# Patient Record
Sex: MALE | Race: WHITE | NOT HISPANIC OR LATINO | Employment: FULL TIME | URBAN - METROPOLITAN AREA
[De-identification: names, ages, dates, MRNs, and addresses within clinical notes are randomized per-mention and may not be internally consistent; named-entity substitution may affect disease eponyms.]

---

## 2017-04-17 ENCOUNTER — ALLSCRIPTS OFFICE VISIT (OUTPATIENT)
Dept: OTHER | Facility: OTHER | Age: 47
End: 2017-04-17

## 2017-12-05 ENCOUNTER — ALLSCRIPTS OFFICE VISIT (OUTPATIENT)
Dept: OTHER | Facility: OTHER | Age: 47
End: 2017-12-05

## 2017-12-05 DIAGNOSIS — Z00.00 ENCOUNTER FOR GENERAL ADULT MEDICAL EXAMINATION WITHOUT ABNORMAL FINDINGS: ICD-10-CM

## 2017-12-05 DIAGNOSIS — Z12.5 ENCOUNTER FOR SCREENING FOR MALIGNANT NEOPLASM OF PROSTATE: ICD-10-CM

## 2017-12-06 NOTE — PROGRESS NOTES
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)    Plan  Health Maintenance    · (1) CBC/PLT/DIFF; Status:Active; Requested for:79Vzy2763;    · (1) COMPREHENSIVE METABOLIC PANEL; Status:Active; Requested for:20Mqr0852;    · (1) LIPID PANEL FASTING W DIRECT LDL REFLEX; Status:Active; Requestedfor:85Bpj3242;    · (1) TSH WITH FT4 REFLEX; Status:Active; Requested for:62Vtu8963; Health Maintenance, Encounter for prostate cancer screening    · (1) PSA (SCREEN) (Dx V76 44 Screen for Prostate Cancer); Status:Active; Requestedfor:51Kff8781;   PMH: Muscle ache    · Butalbital-APAP-Caffeine -40 MG Oral Tablet (Fioricet); TAKE 1 TABLETEVERY 6 HOURS AS NEEDED  PMH: Pain of male genitalia    · Urine Dip Non-Automated- POC; Status:Canceled;   Screening for depression    · *VB-Depression Screening; Status:Temporary Deferral - Pt refuses;    12/5/2018    Discussion/Summary    --probable R lateral femoral cutaneous neuropathy: Patient complains of burning tingling in his right hip for the past few weeks  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  rec: Attempt to avoid tool belt for now  If symptoms persist or worsen, consider checking EMG right lower extremity  given for routine fasting blood work    Patient instructed to get done and schedule a physical examination in near future  The treatment plan was reviewed with the patient/guardian  The patient/guardian understands and agrees with the treatment plan      Chief Complaint  Pt c/o R sided groin and hip burning/tingling x2 wks  History of Present Illness  HPI: Patient complains of burning tingling in his right hip for the past few weeks  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      Review of Systems   Constitutional: no fever or chills, feels well, no tiredness, no recent weight loss or weight gain  Active Problems  1  BPH without urinary obstruction (600 00) (N40 0)   2   Boo Gip tenosynovitis (727 04) (M65 4)   3  Depression (311) (F32 9)   4  Erectile dysfunction (607 84) (N52 9)   5  Esophagitis, reflux (530 11) (K21 0)   6  Familial Combined Hyperlipidemia (272 2)   7  Sleep disorder (780 50) (G47 9)    Past Medical History    1  History of Alcoholism (V11 3)   2  History of Benign essential hypertension (401 1) (I10)   3  History of Bilateral impacted cerumen (380 4) (H61 23)   4  History of Cellulitis of buccal space of mouth (528 3) (K12 2)   5  History of Dog bite of multiple sites (879 8,E906 0) (W54  0XXA)   6  History of abnormal weight loss (V13 89) (Z87 898)   7  History of cellulitis (V13 3) (Z87 2)   8  History of diarrhea (V12 79) (Z87 898)   9  History of eczema (V13 3) (Z87 2)   10  History of epididymitis (V13 89) (Z87 438)   11  History of fatigue (V13 89) (Z87 898)   12  History of nausea and vomiting (V12 79) (Z87 898)   13  History of tinea corporis (V12 09) (Z86 19)   14  History of Laboratory examination ordered as part of a routine general medical  examination (V72 62) (Z00 00)   15  History of Laceration of multiple sites (879 8) (T07  XXXA)   16  History of Muscle ache (729 1) (M79 1)   17  History of Need for immunization against influenza (V04 81) (Z23)   18  History of Pain of male genitalia (608 9) (N50 89)   19  History of Wrist pain (719 43) (M25 539)  Active Problems And Past Medical History Reviewed: The active problems and past medical history were reviewed and updated today  Family History  Mother    1  Family history of Solitary kidney  Father    2  Family history of Prostate Cancer (V16 42)    Social History   · Current Every Day Smoker (305 1)   · Stopped Drinking Alcohol  The social history was reviewed and updated today  The social history was reviewed and is unchanged  Surgical History    1  History of Appendectomy   2  History of Elbow Surgery   3  History of Hand Surgery   4  History of Knee Surgery   5   History of Shoulder Surgery    Current Meds   1  Butalbital-APAP-Caffeine -40 MG Oral Tablet; TAKE 1 TABLET EVERY 6 HOURS AS NEEDED; Therapy: 67LRF6907 to (Last Rx:15Bpn7636)  Requested for: 69Nym5758 Ordered   2  Omeprazole 20 MG Oral Capsule Delayed Release; TAKE 1 CAPSULE Daily; Therapy: 52LLV2817 to ([facility])  Requested for: 89VDJ4204; Last Rx:13Nov2017 Ordered   3  Sertraline HCl - 50 MG Oral Tablet; Take 1 tablet daily; Therapy: 72MMS2299 to ([facility])  Requested for: 56ZQA1141; Last Rx:13Nov2017 Ordered   4  Tamsulosin HCl - 0 4 MG Oral Capsule; TAKE 1 CAPSULE Bedtime; Therapy: 16IWS7286 to (Evaluate:93Rat2581)  Requested for: 31WBV4169; Last Rx:11Nov2016 Ordered    The medication list was reviewed and updated today  Allergies  1  Aspirin Buffered TABS   2  Erythromycin Base TABS   3  Penicillins    Vitals   Recorded: 73CIJ5153 06:18PM   Temperature 97 6 F, Tympanic   Heart Rate 85   Pulse Quality Normal   Respiration Quality Normal   Respiration 16   Systolic 233, LUE, Sitting   Diastolic 86, LUE, Sitting   BP CUFF SIZE Large   Height 5 ft 8 in   Weight 184 lb 9 oz   BMI Calculated 28 06   BSA Calculated 1 98   O2 Saturation 96   Pain Scale 0       Signatures   Electronically signed by :  Kiah Valenzuela DO; Dec  5 2017  6:51PM EST                       (Author)

## 2018-01-11 NOTE — RESULT NOTES
Verified Results  (1) CBC/PLT/DIFF 29EJH1392 12:00AM [facility] Found     Test Name Result Flag Reference   WBC 6 1 x10E3/uL  3 4-10 8   RBC 4 89 x10E6/uL  4 14-5 80   Hemoglobin 15 1 g/dL  12 6-17 7   Hematocrit 44 4 %  37 5-51 0   MCV 91 fL  79-97   MCH 30 9 pg  26 6-33 0   MCHC 34 0 g/dL  31 5-35 7   RDW 13 7 %  12 3-15 4   Platelets 177 H05S5/AS  150-379   Neutrophils 60 %     Lymphs 28 %     Monocytes 10 %     Eos 1 %     Basos 1 %     Neutrophils (Absolute) 3 6 x10E3/uL  1 4-7 0   Lymphs (Absolute) 1 7 x10E3/uL  0 7-3 1   Monocytes(Absolute) 0 6 x10E3/uL  0 1-0 9   Eos (Absolute) 0 1 x10E3/uL  0 0-0 4   Baso (Absolute) 0 1 x10E3/uL  0 0-0 2   Immature Granulocytes 0 %     Immature Grans (Abs) 0 0 x10E3/uL  0 0-0 1     (1) COMPREHENSIVE METABOLIC PANEL 35DKZ2282 75:75EB [facility] Found     Test Name Result Flag Reference   Glucose, Serum 101 mg/dL H 65-99   BUN 19 mg/dL  6-24   Creatinine, Serum 0 84 mg/dL  0 76-1 27   eGFR If NonAfricn Am 105 mL/min/1 73  >59   eGFR If Africn Am 121 mL/min/1 73  >59   BUN/Creatinine Ratio 23 H 9-20   Sodium, Serum 142 mmol/L  134-144   Potassium, Serum 5 2 mmol/L  3 5-5 2   Chloride, Serum 101 mmol/L     Carbon Dioxide, Total 23 mmol/L  18-29   Calcium, Serum 9 9 mg/dL  8 7-10 2   Protein, Total, Serum 6 4 g/dL  6 0-8 5   Albumin, Serum 4 5 g/dL  3 5-5 5   Globulin, Total 1 9 g/dL  1 5-4 5   A/G Ratio 2 4  1 1-2 5   Bilirubin, Total 0 3 mg/dL  0 0-1 2   Alkaline Phosphatase, S 40 IU/L     AST (SGOT) 27 IU/L  0-40   ALT (SGPT) 23 IU/L  0-44     (LC) Lipid Panel 19EQY1489 12:00AM [facility] Found     Test Name Result Flag Reference   Cholesterol, Total 186 mg/dL  100-199   Triglycerides 89 mg/dL  0-149   HDL Cholesterol 34 mg/dL L >39   According to ATP-III Guidelines, HDL-C >59 mg/dL is considered a  negative risk factor for CHD     VLDL Cholesterol Reid 18 mg/dL  5-40   LDL Cholesterol Calc 134 mg/dL H 0-99     (1) PSA (SCREEN) (Dx V76 44 Screen for Prostate Cancer) 32RJO8062 12:00AM [facility]     Test Name Result Flag Reference   Prostate Specific Ag, Serum 0 7 ng/mL  0 0-4 0   Roche ECLIA methodology  According to the American Urological Association, Serum PSA should  decrease and remain at undetectable levels after radical  prostatectomy  The AUA defines biochemical recurrence as an initial  PSA value 0 2 ng/mL or greater followed by a subsequent confirmatory  PSA value 0 2 ng/mL or greater  Values obtained with different assay methods or kits cannot be used  interchangeably  Results cannot be interpreted as absolute evidence  of the presence or absence of malignant disease  (1) TSH WITH FT4 REFLEX 17PSG4167 12:00AM [facility]     Test Name Result Flag Reference   TSH 1 140 uIU/mL  0 450-4 500     (1) VITAMIN D 25-HYDROXY 75GKO4210 12:00AM [facility]     Test Name Result Flag Reference   Vitamin D, 25-Hydroxy 24 8 ng/mL L 30 0-100 0   Vitamin D deficiency has been defined by the [address] practice guideline as a  level of serum 25-OH vitamin D less than 20 ng/mL (1,2)  The Endocrine Society went on to further define vitamin D  insufficiency as a level between 21 and 29 ng/mL (2)  1  IOM ([place] [employer])  2010  Dietary reference     intakes for calcium and D  [facility]: The     POI  2  Jonathan MF, Nish DEL VALLE, Frank CLEMENTS, et al      Evaluation, treatment, and prevention of vitamin D     deficiency: an Endocrine Society clinical practice     guideline  JCEM  2011 Jul; 96(7):1911-96

## 2018-01-13 VITALS
TEMPERATURE: 98.2 F | SYSTOLIC BLOOD PRESSURE: 114 MMHG | DIASTOLIC BLOOD PRESSURE: 80 MMHG | RESPIRATION RATE: 16 BRPM | BODY MASS INDEX: 27.11 KG/M2 | HEART RATE: 70 BPM | OXYGEN SATURATION: 98 % | WEIGHT: 183.56 LBS

## 2018-01-13 NOTE — PROGRESS NOTES
Assessment    1  Encounter for preventive health examination (V70 0) (Z00 00)   2  Sleep disorder (780 50) (G47 9)   3  BPH without urinary obstruction (600 00) (N40 0)   4  Erectile dysfunction (607 84) (N52 9)   5  De Quervain's tenosynovitis (727 04) (M65 4)   6  Esophagitis, reflux (530 11) (K21 0)    Plan  BPH without urinary obstruction, Health Maintenance, Esophagitis, reflux, Familial  Combined Hyperlipidemia    · (1) CBC/PLT/DIFF; Status:Active; Requested for:22Mar2016;   BPH without urinary obstruction, Health Maintenance, Esophagitis, reflux, Familial  Combined Hyperlipidemia, Sleep disorder    · (1) COMPREHENSIVE METABOLIC PANEL; Status:Active; Requested for:22Mar2016;    · (1) LIPID PANEL, FASTING; Status:Active; Requested for:22Mar2016;    · (1) PSA (SCREEN) (Dx V76 44 Screen for Prostate Cancer); Status:Active; Requested  for:22Mar2016;    · (1) TESTOSTERONE, FREE (DIRECT) AND TOTAL; Status:Active; Requested  for:22Mar2016;    · (1) TSH WITH FT4 REFLEX; Status:Active; Requested for:22Mar2016;    · (1) VITAMIN D 25-HYDROXY; Status:Active; Requested for:22Mar2016;   Depression    · Sertraline HCl - 50 MG Oral Tablet; TAKE 1 AND 1/2 TABLETS DAILY  Need for immunization against influenza    · Stop: Fluzone Quadrivalent Intramuscular Suspension    Discussion/Summary    54year-old physical examination today  Will give Rx for fasting blood work  --Sleep disorder/ fatigue: patient complaining of chronic sleep problems ( mostly falling asleep)  Patient had a home sleep study done which showed 12 apneic events hourly  He is yet to follow-up for his C Pap titration  Will check fasting blood work to check thyroid, testosterone levels  --probable BPH: patient complaining of nocturia and urinary hesitation, worsening over the past 6 months or so  I believe his symptoms are due to BPH  His prostate is enlarged today, 1-1/2-2 times  Smooth  Without nodules   Patient has a family history of prostate cancer ( father)  Will check PSA  Possibly start trial of tamsulosin  --Depression: doing well on sertraline 75 mg daily  Remainder of sigecaps neg    Will call with lab results    May need to see patient back to discuss next steps  Possible side effects of new medications were reviewed with the patient/guardian today  Chief Complaint  Pt here for annual physical and c/o difficulty falling asleep that began about a year ago  History of Present Illness  HPI: 53 yo PE today  pt c/o of increased urination x 6 mos, michael at night  pt also having probs falling asleep  he had a home sleep study done recently which showed 12 apneic events hourly  Review of Systems    Constitutional: feeling tired, but as noted in HPI    ENT: no complaints of earache, no hearing loss, no nosebleeds, no nasal discharge, no sore throat, no hoarseness  Cardiovascular: No complaints of slow heart rate, no fast heart rate, no chest pain, no palpitations, no leg claudication, no lower extremity  Respiratory: No complaints of shortness of breath, no wheezing, no cough, no SOB on exertion, no orthopnea or PND  Gastrointestinal: No complaints of abdominal pain, no constipation, no nausea or vomiting, no diarrhea or bloody stools  Genitourinary: as noted in HPI  Active Problems    1  Benign essential hypertension (401 1) (I10)   2  Current tobacco use (305 1) (Z72 0)   3  De Quervain's tenosynovitis (727 04) (M65 4)   4  Depression (311) (F32 9)   5  Erectile dysfunction (607 84) (N52 9)   6  Esophagitis, reflux (530 11) (K21 0)   7  Familial Combined Hyperlipidemia (272 2)   8  Sleep disorder (780 50) (G47 9)   9   Wrist pain (719 43) (M25 539)    Past Medical History    · History of Alcoholism (V11 3)   · History of Bilateral impacted cerumen (380 4) (H61 23)   · History of abnormal weight loss (V13 89) (T16 540)   · History of diarrhea (V12 79) (L71 927)   · History of eczema (V13 3) (Z87 2)   · History of fatigue (V13 89) (Y52 761)   · History of nausea and vomiting (V12 79) (Z87 898)   · History of tinea corporis (V12 09) (Z86 19)   · History of Laboratory examination ordered as part of a routine general medical  examination (V72 62) (Z00 00)   · History of Muscle ache (729 1) (M79 1)    Family History    · Family history of Prostate Cancer (V16 42)    Social History    · Current Every Day Smoker (305 1)   · Current tobacco use (305 1) (Z72 0)   · Stopped Drinking Alcohol    Current Meds   1  Butalbital-APAP-Caffeine -40 MG Oral Tablet; TAKE 1 TABLET EVERY 6 HOURS   AS NEEDED; Therapy: 91ZCS6510 to (Last Rx:18Mar2016)  Requested for: 78HSO6923 Ordered   2  Omeprazole 20 MG Oral Capsule Delayed Release; take 1 capsule daily; Therapy: 44UEW1895 to ([facility])  Requested for: 20Yug8601; Last   Rx:72Emm1948 Ordered   3  Sertraline HCl - 50 MG Oral Tablet; TAKE 1 AND 1/2 TABLETS DAILY; Therapy: 35DNY1923 to (Evaluate:55Vxk2522)  Requested for: 71UDC5998; Last   Rx:81Rim1946 Ordered   4  Viagra 50 MG Oral Tablet; take as directed; Therapy: 67VPY5137 to 94681 22 77 32)  Requested for: 94ECU6715; Last   Rx:20Mob2613 Ordered    Allergies    1  Aspirin Buffered TABS   2  Erythromycin Base TABS   3  Penicillins    Vitals   Recorded: 69LOF1697 02:51PM   Temperature 98 1 F, Tympanic   Heart Rate 90   Respiration 16   Systolic 775   Diastolic 86   Height 5 ft 9 in   Weight 182 lb 8 0 oz   BMI Calculated 26 95   BSA Calculated 1 98   O2 Saturation 99, RA     Physical Exam    Constitutional   General appearance: No acute distress, well appearing and well nourished  Pulmonary   Respiratory effort: No increased work of breathing or signs of respiratory distress  Auscultation of lungs: Clear to auscultation  Cardiovascular   Palpation of heart: Normal PMI, no thrills  Auscultation of heart: Normal rate and rhythm, normal S1 and S2, without murmurs      Examination of extremities for edema and/or varicosities: Normal     Lymphatic   Palpation of lymph nodes in neck: No lymphadenopathy  Musculoskeletal   Gait and station: Normal     Neurologic   Cranial nerves: Cranial nerves 2-12 intact  Psychiatric   Orientation to person, place and time: Normal     Mood and affect: Normal        Signatures   Electronically signed by :  Matthew Hyde DO; Mar 22 2016  3:17PM EST                       (Author)

## 2018-01-17 NOTE — RESULT NOTES
Verified Results  [address] [facility] 49VYK1534 05:31PM Shantal Morales Order Number: [id number]    - Patient Instructions: To schedule this appointment, please contact Central Scheduling at [phone]  Test Name Result Flag Reference   US SCROTUM AND TESTICLES (Report)     SCROTAL ULTRASOUND     INDICATION: Right-sided testicular pain  COMPARISON: None  TECHNIQUE:  Ultrasound the scrotal contents was performed with a high frequency linear transducer utilizing volumetric sweep imaging as well as standard still image techniques  Imaging performed in longitudinal and transverse orientation  Color and    spectral Doppler evaluation also performed bilaterally  FINDINGS:     TESTES:    Testes are symmetric and normal in size  RIGHT testis = 4 7 x 2 3 x 3 4 cm    Normal contour with homogeneous smooth echotexture  1 mm cyst in the mid right testis, adjacent to the tunica albuginea  No other intratesticular mass lesion or calcifications  LEFT testis = 4 2 x 2 3 x 3 0 cm   Normal contour with homogeneous smooth echotexture  No intratesticular mass lesion or calcifications  Doppler flow within both testes is present and symmetric  EPIDIDYMIDES:    Normal Size  Doppler ultrasound demonstrates normal blood flow  3 mm cyst in the left epididymal head  HYDROCELE: No significant fluid present  VARICOCELE: None present  SCROTUM: Scrotal thickness and appearance within normal limits  No evidence for extratesticular mass or hernia demonstrated         IMPRESSION:      Unremarkable scrotal sonogram         Workstation performed: [id number]     Signed by:   Brad Graf MD   9/29/16

## 2018-01-18 ENCOUNTER — TRANSCRIBE ORDERS (OUTPATIENT)
Dept: ADMINISTRATIVE | Facility: HOSPITAL | Age: 48
End: 2018-01-18

## 2018-01-18 ENCOUNTER — APPOINTMENT (OUTPATIENT)
Dept: LAB | Facility: MEDICAL CENTER | Age: 48
End: 2018-01-18
Payer: COMMERCIAL

## 2018-01-18 DIAGNOSIS — Z00.00 ENCOUNTER FOR GENERAL ADULT MEDICAL EXAMINATION WITHOUT ABNORMAL FINDINGS: ICD-10-CM

## 2018-01-18 DIAGNOSIS — Z12.5 ENCOUNTER FOR SCREENING FOR MALIGNANT NEOPLASM OF PROSTATE: ICD-10-CM

## 2018-01-18 LAB
ALBUMIN SERPL BCP-MCNC: 4.2 G/DL (ref 3.5–5)
ALP SERPL-CCNC: 44 U/L (ref 46–116)
ALT SERPL W P-5'-P-CCNC: 30 U/L (ref 12–78)
ANION GAP SERPL CALCULATED.3IONS-SCNC: 6 MMOL/L (ref 4–13)
AST SERPL W P-5'-P-CCNC: 19 U/L (ref 5–45)
BASOPHILS # BLD AUTO: 0.05 THOUSANDS/ΜL (ref 0–0.1)
BASOPHILS NFR BLD AUTO: 1 % (ref 0–1)
BILIRUB SERPL-MCNC: 0.64 MG/DL (ref 0.2–1)
BUN SERPL-MCNC: 15 MG/DL (ref 5–25)
CALCIUM SERPL-MCNC: 9.4 MG/DL (ref 8.3–10.1)
CHLORIDE SERPL-SCNC: 101 MMOL/L (ref 100–108)
CHOLEST SERPL-MCNC: 195 MG/DL (ref 50–200)
CO2 SERPL-SCNC: 31 MMOL/L (ref 21–32)
CREAT SERPL-MCNC: 0.85 MG/DL (ref 0.6–1.3)
EOSINOPHIL # BLD AUTO: 0.1 THOUSAND/ΜL (ref 0–0.61)
EOSINOPHIL NFR BLD AUTO: 1 % (ref 0–6)
ERYTHROCYTE [DISTWIDTH] IN BLOOD BY AUTOMATED COUNT: 13.4 % (ref 11.6–15.1)
GFR SERPL CREATININE-BSD FRML MDRD: 103 ML/MIN/1.73SQ M
GLUCOSE P FAST SERPL-MCNC: 95 MG/DL (ref 65–99)
HCT VFR BLD AUTO: 45.4 % (ref 36.5–49.3)
HDLC SERPL-MCNC: 42 MG/DL (ref 40–60)
HGB BLD-MCNC: 15.9 G/DL (ref 12–17)
LDLC SERPL CALC-MCNC: 122 MG/DL (ref 0–100)
LYMPHOCYTES # BLD AUTO: 2.12 THOUSANDS/ΜL (ref 0.6–4.47)
LYMPHOCYTES NFR BLD AUTO: 23 % (ref 14–44)
MCH RBC QN AUTO: 31.9 PG (ref 26.8–34.3)
MCHC RBC AUTO-ENTMCNC: 35 G/DL (ref 31.4–37.4)
MCV RBC AUTO: 91 FL (ref 82–98)
MONOCYTES # BLD AUTO: 0.87 THOUSAND/ΜL (ref 0.17–1.22)
MONOCYTES NFR BLD AUTO: 9 % (ref 4–12)
NEUTROPHILS # BLD AUTO: 6.21 THOUSANDS/ΜL (ref 1.85–7.62)
NEUTS SEG NFR BLD AUTO: 66 % (ref 43–75)
NRBC BLD AUTO-RTO: 0 /100 WBCS
PLATELET # BLD AUTO: 278 THOUSANDS/UL (ref 149–390)
PMV BLD AUTO: 9.3 FL (ref 8.9–12.7)
POTASSIUM SERPL-SCNC: 4.3 MMOL/L (ref 3.5–5.3)
PROT SERPL-MCNC: 7.5 G/DL (ref 6.4–8.2)
PSA SERPL-MCNC: 0.6 NG/ML (ref 0–4)
RBC # BLD AUTO: 4.98 MILLION/UL (ref 3.88–5.62)
SODIUM SERPL-SCNC: 138 MMOL/L (ref 136–145)
TRIGL SERPL-MCNC: 153 MG/DL
TSH SERPL DL<=0.05 MIU/L-ACNC: 1.31 UIU/ML (ref 0.36–3.74)
WBC # BLD AUTO: 9.4 THOUSAND/UL (ref 4.31–10.16)

## 2018-01-18 PROCEDURE — 80053 COMPREHEN METABOLIC PANEL: CPT

## 2018-01-18 PROCEDURE — 84443 ASSAY THYROID STIM HORMONE: CPT

## 2018-01-18 PROCEDURE — 36415 COLL VENOUS BLD VENIPUNCTURE: CPT

## 2018-01-18 PROCEDURE — 80061 LIPID PANEL: CPT

## 2018-01-18 PROCEDURE — 85025 COMPLETE CBC W/AUTO DIFF WBC: CPT

## 2018-01-18 PROCEDURE — G0103 PSA SCREENING: HCPCS

## 2018-01-22 VITALS
HEIGHT: 68 IN | DIASTOLIC BLOOD PRESSURE: 86 MMHG | HEART RATE: 85 BPM | OXYGEN SATURATION: 96 % | TEMPERATURE: 97.6 F | BODY MASS INDEX: 27.97 KG/M2 | SYSTOLIC BLOOD PRESSURE: 118 MMHG | RESPIRATION RATE: 16 BRPM | WEIGHT: 184.56 LBS

## 2018-04-07 DIAGNOSIS — R51.9 NONINTRACTABLE HEADACHE, UNSPECIFIED CHRONICITY PATTERN, UNSPECIFIED HEADACHE TYPE: Primary | ICD-10-CM

## 2018-04-07 RX ORDER — BUTALBITAL, ACETAMINOPHEN AND CAFFEINE 50; 325; 40 MG/1; MG/1; MG/1
TABLET ORAL
Qty: 30 TABLET | Refills: 1 | Status: SHIPPED | OUTPATIENT
Start: 2018-04-07 | End: 2019-02-04 | Stop reason: SDUPTHER

## 2018-07-17 DIAGNOSIS — F32.A DEPRESSION, UNSPECIFIED DEPRESSION TYPE: Primary | ICD-10-CM

## 2018-08-06 ENCOUNTER — TELEPHONE (OUTPATIENT)
Dept: FAMILY MEDICINE CLINIC | Facility: CLINIC | Age: 48
End: 2018-08-06

## 2018-08-06 NOTE — TELEPHONE ENCOUNTER
Pt called requesting to speak with someone in charge about an rx refill  Informed him I could help him with that  Pt said no thank you, he would like to speak to someone else  He stated he has called 3 times to get an rx refilled and it keeps getting messed up  Pt would not tell me which rx he was referring to   Please call pt at [phone]

## 2018-08-07 DIAGNOSIS — F32.A DEPRESSION, UNSPECIFIED DEPRESSION TYPE: ICD-10-CM

## 2018-08-07 NOTE — TELEPHONE ENCOUNTER
Patient called - RX sent to [facility] in [address] [place] [address], should have gone to [facility] in [facility]  [facility] will not transfer RX internally

## 2018-08-07 NOTE — TELEPHONE ENCOUNTER
Last appointment 12/05/2017 - No future appointment  Patient is aware he needs a medication check appointment and he will call at the end of the week to schedule

## 2018-10-17 DIAGNOSIS — K21.9 GASTROESOPHAGEAL REFLUX DISEASE, ESOPHAGITIS PRESENCE NOT SPECIFIED: Primary | ICD-10-CM

## 2018-10-17 RX ORDER — OMEPRAZOLE 20 MG/1
20 CAPSULE, DELAYED RELEASE ORAL DAILY
Refills: 0 | COMMUNITY
Start: 2018-07-25 | End: 2018-10-17 | Stop reason: SDUPTHER

## 2018-10-17 RX ORDER — OMEPRAZOLE 20 MG/1
20 CAPSULE, DELAYED RELEASE ORAL DAILY
Qty: 90 CAPSULE | Refills: 0 | Status: SHIPPED | OUTPATIENT
Start: 2018-10-17 | End: 2019-01-15 | Stop reason: SDUPTHER

## 2018-12-13 ENCOUNTER — OFFICE VISIT (OUTPATIENT)
Dept: FAMILY MEDICINE CLINIC | Facility: CLINIC | Age: 48
End: 2018-12-13
Payer: COMMERCIAL

## 2018-12-13 VITALS
SYSTOLIC BLOOD PRESSURE: 120 MMHG | HEIGHT: 67 IN | RESPIRATION RATE: 18 BRPM | BODY MASS INDEX: 28.56 KG/M2 | HEART RATE: 94 BPM | TEMPERATURE: 98.2 F | DIASTOLIC BLOOD PRESSURE: 84 MMHG | WEIGHT: 182 LBS | OXYGEN SATURATION: 97 %

## 2018-12-13 DIAGNOSIS — Z72.0 TOBACCO USE: ICD-10-CM

## 2018-12-13 DIAGNOSIS — N40.0 BENIGN PROSTATIC HYPERPLASIA WITHOUT LOWER URINARY TRACT SYMPTOMS: ICD-10-CM

## 2018-12-13 DIAGNOSIS — E78.2 MIXED HYPERLIPIDEMIA: ICD-10-CM

## 2018-12-13 DIAGNOSIS — K21.00 ESOPHAGITIS, REFLUX: ICD-10-CM

## 2018-12-13 DIAGNOSIS — IMO0002 CHRONIC MIGRAINE: ICD-10-CM

## 2018-12-13 DIAGNOSIS — R53.83 FATIGUE, UNSPECIFIED TYPE: ICD-10-CM

## 2018-12-13 DIAGNOSIS — Z00.00 WELL ADULT EXAM: Primary | ICD-10-CM

## 2018-12-13 DIAGNOSIS — Z80.42 FAMILY HISTORY OF PROSTATE CANCER: ICD-10-CM

## 2018-12-13 DIAGNOSIS — F32.A DEPRESSION, UNSPECIFIED DEPRESSION TYPE: ICD-10-CM

## 2018-12-13 PROCEDURE — 99396 PREV VISIT EST AGE 40-64: CPT | Performed by: FAMILY MEDICINE

## 2018-12-13 RX ORDER — TAMSULOSIN HYDROCHLORIDE 0.4 MG/1
0.4 CAPSULE ORAL
Qty: 90 CAPSULE | Refills: 1
Start: 2018-12-13 | End: 2019-12-26

## 2018-12-13 NOTE — ASSESSMENT & PLAN NOTE
History of high cholesterol  Recommend low-fat low-cholesterol diet and exercise    Repeat lipids in near future

## 2018-12-13 NOTE — ASSESSMENT & PLAN NOTE
Doing well with occasional use of tamsulosin  Patient has family history of prostate cancer (father)    Will check PSA along with routine labs

## 2018-12-13 NOTE — PROGRESS NOTES
Assessment/Plan:    Well adult exam   40-year-old presents for yearly physical today  He complains of some fatigue, otherwise he is feeling well  Doing well on all prescribed medications  He does not engage in any formal exercise, but is very active at work  He does smoke 1 pack per day and I counseled him today regarding this  His examination is essentially unremarkable  Will sent for fasting blood work, including free and total testosterone  And PSA due to family history  Will call with results     yearly/prn    Esophagitis, reflux   Doing well on omeprazole 20 mg daily without breakthrough symptoms    Depression   Doing well on sertraline 75 mg daily  Without side effects  Med refilled today    Mixed hyperlipidemia   History of high cholesterol  Recommend low-fat low-cholesterol diet and exercise  Repeat lipids in near future    Tobacco use   Patient still smoking 1 pack per day  Counseled again today    Fatigue   Patient complaining of fatigue recently  Will check labs including testosterone level    Benign prostatic hyperplasia without lower urinary tract symptoms   Doing well with occasional use of tamsulosin  Patient has family history of prostate cancer (father)  Will check PSA along with routine labs    Chronic migraine   Doing well on rare / occasional use of butalbital /acetaminophen /caffeine  Without side effects       Diagnoses and all orders for this visit:    Well adult exam    Depression, unspecified depression type  -     sertraline (ZOLOFT) 50 mg tablet; Take 1 1/2 tablet by mouth daily    Esophagitis, reflux    Mixed hyperlipidemia  -     Comprehensive metabolic panel; Future  -     Lipid Panel with Direct LDL reflex; Future    Tobacco use    Fatigue, unspecified type  -     CBC and differential; Future  -     TSH, 3rd generation with Free T4 reflex;  Future  -     Testosterone, free, total; Future    Benign prostatic hyperplasia without lower urinary tract symptoms  - tamsulosin (FLOMAX) 0 4 mg; Take 1 capsule (0 4 mg total) by mouth daily with dinner    Chronic migraine    Family history of prostate cancer  -     PSA, Total Screen; Future       RX GIVEN FOR FASTING BLOOD WORK AT [facility]  WILL CALL WITH RESULTS     YEARLY/PRN    Subjective:      Patient ID: Yon Adams is a 49 y o  male  48 yo PE today  Overall he feels well  He maintains an active lifestyle (mostly at work)  Denies any CP/SOB  Pt smoking 1 ppd  Doing well on sertraline 75  Mg daily for depression  Doing well on omeprazole for GERD  Patient has been noticing that he has been more fatigued recently        The following portions of the patient's history were reviewed and updated as appropriate: allergies, current medications, past family history, past medical history, past social history, past surgical history and problem list     Review of Systems   Constitutional: Positive for fatigue  HENT: Negative  Respiratory: Negative  Cardiovascular: Negative  Gastrointestinal: Negative  Genitourinary: Negative  Musculoskeletal: Negative  Objective:      /84 (BP Location: Left arm, Patient Position: Sitting, Cuff Size: Adult)   Pulse 94   Temp 98 2 °F (36 8 °C) (Tympanic)   Resp 18   Ht 5' 7 32" (1 71 m)   Wt 82 6 kg (182 lb)   SpO2 97%   BMI 28 23 kg/m²          Physical Exam   Constitutional: He is oriented to person, place, and time  He appears well-developed and well-nourished  HENT:   Head: Normocephalic and atraumatic  Right Ear: External ear normal    Left Ear: External ear normal    Mouth/Throat: Oropharynx is clear and moist    Eyes: Pupils are equal, round, and reactive to light  Neck: Normal range of motion  Neck supple  Cardiovascular: Normal rate, regular rhythm and normal heart sounds  Pulmonary/Chest: Effort normal and breath sounds normal    Abdominal: Soft  Bowel sounds are normal    Neurological: He is alert and oriented to person, place, and time   He has normal reflexes  Psychiatric: He has a normal mood and affect  His behavior is normal  Judgment and thought content normal    Nursing note and vitals reviewed

## 2018-12-13 NOTE — ASSESSMENT & PLAN NOTE
16-year-old presents for yearly physical today  He complains of some fatigue, otherwise he is feeling well  Doing well on all prescribed medications  He does not engage in any formal exercise, but is very active at work  He does smoke 1 pack per day and I counseled him today regarding this  His examination is essentially unremarkable  Will sent for fasting blood work, including free and total testosterone  And PSA due to family history    Will call with results     yearly/prn

## 2018-12-15 ENCOUNTER — APPOINTMENT (OUTPATIENT)
Dept: LAB | Facility: MEDICAL CENTER | Age: 48
End: 2018-12-15
Payer: COMMERCIAL

## 2018-12-15 DIAGNOSIS — E78.2 MIXED HYPERLIPIDEMIA: ICD-10-CM

## 2018-12-15 DIAGNOSIS — R53.83 FATIGUE, UNSPECIFIED TYPE: ICD-10-CM

## 2018-12-15 DIAGNOSIS — Z80.42 FAMILY HISTORY OF PROSTATE CANCER: ICD-10-CM

## 2018-12-15 LAB
ALBUMIN SERPL BCP-MCNC: 3.9 G/DL (ref 3.5–5)
ALP SERPL-CCNC: 47 U/L (ref 46–116)
ALT SERPL W P-5'-P-CCNC: 22 U/L (ref 12–78)
ANION GAP SERPL CALCULATED.3IONS-SCNC: 3 MMOL/L (ref 4–13)
AST SERPL W P-5'-P-CCNC: 17 U/L (ref 5–45)
BASOPHILS # BLD AUTO: 0.06 THOUSANDS/ΜL (ref 0–0.1)
BASOPHILS NFR BLD AUTO: 1 % (ref 0–1)
BILIRUB SERPL-MCNC: 0.47 MG/DL (ref 0.2–1)
BUN SERPL-MCNC: 18 MG/DL (ref 5–25)
CALCIUM SERPL-MCNC: 9.6 MG/DL (ref 8.3–10.1)
CHLORIDE SERPL-SCNC: 106 MMOL/L (ref 100–108)
CHOLEST SERPL-MCNC: 186 MG/DL (ref 50–200)
CO2 SERPL-SCNC: 29 MMOL/L (ref 21–32)
CREAT SERPL-MCNC: 0.91 MG/DL (ref 0.6–1.3)
EOSINOPHIL # BLD AUTO: 0.12 THOUSAND/ΜL (ref 0–0.61)
EOSINOPHIL NFR BLD AUTO: 1 % (ref 0–6)
ERYTHROCYTE [DISTWIDTH] IN BLOOD BY AUTOMATED COUNT: 13 % (ref 11.6–15.1)
GFR SERPL CREATININE-BSD FRML MDRD: 99 ML/MIN/1.73SQ M
GLUCOSE P FAST SERPL-MCNC: 99 MG/DL (ref 65–99)
HCT VFR BLD AUTO: 45.8 % (ref 36.5–49.3)
HDLC SERPL-MCNC: 39 MG/DL (ref 40–60)
HGB BLD-MCNC: 15.2 G/DL (ref 12–17)
IMM GRANULOCYTES # BLD AUTO: 0.03 THOUSAND/UL (ref 0–0.2)
IMM GRANULOCYTES NFR BLD AUTO: 0 % (ref 0–2)
LDLC SERPL CALC-MCNC: 126 MG/DL (ref 0–100)
LYMPHOCYTES # BLD AUTO: 2.13 THOUSANDS/ΜL (ref 0.6–4.47)
LYMPHOCYTES NFR BLD AUTO: 25 % (ref 14–44)
MCH RBC QN AUTO: 31 PG (ref 26.8–34.3)
MCHC RBC AUTO-ENTMCNC: 33.2 G/DL (ref 31.4–37.4)
MCV RBC AUTO: 93 FL (ref 82–98)
MONOCYTES # BLD AUTO: 0.76 THOUSAND/ΜL (ref 0.17–1.22)
MONOCYTES NFR BLD AUTO: 9 % (ref 4–12)
NEUTROPHILS # BLD AUTO: 5.44 THOUSANDS/ΜL (ref 1.85–7.62)
NEUTS SEG NFR BLD AUTO: 64 % (ref 43–75)
NRBC BLD AUTO-RTO: 0 /100 WBCS
PLATELET # BLD AUTO: 255 THOUSANDS/UL (ref 149–390)
PMV BLD AUTO: 9.7 FL (ref 8.9–12.7)
POTASSIUM SERPL-SCNC: 4.4 MMOL/L (ref 3.5–5.3)
PROT SERPL-MCNC: 7.2 G/DL (ref 6.4–8.2)
PSA SERPL-MCNC: 0.9 NG/ML (ref 0–4)
RBC # BLD AUTO: 4.91 MILLION/UL (ref 3.88–5.62)
SODIUM SERPL-SCNC: 138 MMOL/L (ref 136–145)
TRIGL SERPL-MCNC: 103 MG/DL
TSH SERPL DL<=0.05 MIU/L-ACNC: 0.99 UIU/ML (ref 0.36–3.74)
WBC # BLD AUTO: 8.54 THOUSAND/UL (ref 4.31–10.16)

## 2018-12-15 PROCEDURE — 85025 COMPLETE CBC W/AUTO DIFF WBC: CPT

## 2018-12-15 PROCEDURE — G0103 PSA SCREENING: HCPCS

## 2018-12-15 PROCEDURE — 80061 LIPID PANEL: CPT

## 2018-12-15 PROCEDURE — 80053 COMPREHEN METABOLIC PANEL: CPT

## 2018-12-15 PROCEDURE — 84403 ASSAY OF TOTAL TESTOSTERONE: CPT

## 2018-12-15 PROCEDURE — 36415 COLL VENOUS BLD VENIPUNCTURE: CPT

## 2018-12-15 PROCEDURE — 84402 ASSAY OF FREE TESTOSTERONE: CPT

## 2018-12-15 PROCEDURE — 84443 ASSAY THYROID STIM HORMONE: CPT

## 2018-12-17 LAB
TESTOST FREE SERPL-MCNC: 11.1 PG/ML (ref 6.8–21.5)
TESTOST SERPL-MCNC: 649 NG/DL (ref 264–916)

## 2019-01-15 DIAGNOSIS — K21.9 GASTROESOPHAGEAL REFLUX DISEASE, ESOPHAGITIS PRESENCE NOT SPECIFIED: ICD-10-CM

## 2019-01-15 RX ORDER — OMEPRAZOLE 20 MG/1
20 CAPSULE, DELAYED RELEASE ORAL DAILY
Qty: 90 CAPSULE | Refills: 1 | Status: SHIPPED | OUTPATIENT
Start: 2019-01-15 | End: 2019-07-03 | Stop reason: SDUPTHER

## 2019-02-04 DIAGNOSIS — R51.9 NONINTRACTABLE HEADACHE, UNSPECIFIED CHRONICITY PATTERN, UNSPECIFIED HEADACHE TYPE: ICD-10-CM

## 2019-02-04 RX ORDER — BUTALBITAL, ACETAMINOPHEN AND CAFFEINE 50; 325; 40 MG/1; MG/1; MG/1
1 TABLET ORAL EVERY 6 HOURS PRN
Qty: 30 TABLET | Refills: 0 | Status: SHIPPED | OUTPATIENT
Start: 2019-02-04 | End: 2019-07-11 | Stop reason: SDUPTHER

## 2019-05-10 DIAGNOSIS — M25.50 ARTHRALGIA, UNSPECIFIED JOINT: Primary | ICD-10-CM

## 2019-07-03 DIAGNOSIS — K21.9 GASTROESOPHAGEAL REFLUX DISEASE, ESOPHAGITIS PRESENCE NOT SPECIFIED: ICD-10-CM

## 2019-07-03 RX ORDER — OMEPRAZOLE 20 MG/1
20 CAPSULE, DELAYED RELEASE ORAL DAILY
Qty: 90 CAPSULE | Refills: 1 | Status: SHIPPED | OUTPATIENT
Start: 2019-07-03 | End: 2019-12-18 | Stop reason: SDUPTHER

## 2019-07-05 DIAGNOSIS — K21.9 GASTROESOPHAGEAL REFLUX DISEASE, ESOPHAGITIS PRESENCE NOT SPECIFIED: ICD-10-CM

## 2019-07-07 RX ORDER — OMEPRAZOLE 20 MG/1
CAPSULE, DELAYED RELEASE ORAL
Qty: 90 CAPSULE | Refills: 1 | Status: SHIPPED | OUTPATIENT
Start: 2019-07-07 | End: 2019-12-26 | Stop reason: ALTCHOICE

## 2019-07-11 DIAGNOSIS — R51.9 NONINTRACTABLE HEADACHE, UNSPECIFIED CHRONICITY PATTERN, UNSPECIFIED HEADACHE TYPE: ICD-10-CM

## 2019-07-11 RX ORDER — BUTALBITAL, ACETAMINOPHEN AND CAFFEINE 50; 325; 40 MG/1; MG/1; MG/1
1 TABLET ORAL EVERY 6 HOURS PRN
Qty: 30 TABLET | Refills: 0 | Status: SHIPPED | OUTPATIENT
Start: 2019-07-11

## 2019-09-04 DIAGNOSIS — F32.A DEPRESSION, UNSPECIFIED DEPRESSION TYPE: ICD-10-CM

## 2019-12-18 DIAGNOSIS — K21.9 GASTROESOPHAGEAL REFLUX DISEASE, ESOPHAGITIS PRESENCE NOT SPECIFIED: ICD-10-CM

## 2019-12-18 RX ORDER — OMEPRAZOLE 20 MG/1
20 CAPSULE, DELAYED RELEASE ORAL DAILY
Qty: 90 CAPSULE | Refills: 0 | Status: SHIPPED | OUTPATIENT
Start: 2019-12-18 | End: 2020-05-18 | Stop reason: SDUPTHER

## 2019-12-19 ENCOUNTER — TELEPHONE (OUTPATIENT)
Dept: FAMILY MEDICINE CLINIC | Facility: CLINIC | Age: 49
End: 2019-12-19

## 2019-12-26 ENCOUNTER — OFFICE VISIT (OUTPATIENT)
Dept: FAMILY MEDICINE CLINIC | Facility: CLINIC | Age: 49
End: 2019-12-26
Payer: COMMERCIAL

## 2019-12-26 VITALS
RESPIRATION RATE: 18 BRPM | HEIGHT: 69 IN | OXYGEN SATURATION: 98 % | SYSTOLIC BLOOD PRESSURE: 140 MMHG | DIASTOLIC BLOOD PRESSURE: 80 MMHG | WEIGHT: 185.5 LBS | TEMPERATURE: 98 F | BODY MASS INDEX: 27.47 KG/M2 | HEART RATE: 89 BPM

## 2019-12-26 DIAGNOSIS — E78.2 MIXED HYPERLIPIDEMIA: ICD-10-CM

## 2019-12-26 DIAGNOSIS — Z00.00 WELL ADULT EXAM: Primary | ICD-10-CM

## 2019-12-26 DIAGNOSIS — K21.00 ESOPHAGITIS, REFLUX: ICD-10-CM

## 2019-12-26 DIAGNOSIS — Z12.5 SCREENING FOR PROSTATE CANCER: ICD-10-CM

## 2019-12-26 DIAGNOSIS — Z13.29 SCREENING FOR THYROID DISORDER: ICD-10-CM

## 2019-12-26 DIAGNOSIS — Z13.0 SCREENING FOR DEFICIENCY ANEMIA: ICD-10-CM

## 2019-12-26 DIAGNOSIS — Z72.0 TOBACCO USE: ICD-10-CM

## 2019-12-26 DIAGNOSIS — F32.A DEPRESSION, UNSPECIFIED DEPRESSION TYPE: ICD-10-CM

## 2019-12-26 DIAGNOSIS — N52.9 ERECTILE DYSFUNCTION, UNSPECIFIED ERECTILE DYSFUNCTION TYPE: ICD-10-CM

## 2019-12-26 DIAGNOSIS — K21.9 GASTROESOPHAGEAL REFLUX DISEASE, ESOPHAGITIS PRESENCE NOT SPECIFIED: ICD-10-CM

## 2019-12-26 PROCEDURE — 99396 PREV VISIT EST AGE 40-64: CPT | Performed by: FAMILY MEDICINE

## 2019-12-26 RX ORDER — SILDENAFIL 100 MG/1
100 TABLET, FILM COATED ORAL DAILY PRN
Qty: 5 TABLET | Refills: 2 | Status: SHIPPED | OUTPATIENT
Start: 2019-12-26 | End: 2020-08-12

## 2019-12-26 NOTE — PROGRESS NOTES
[facility]      NAME: Miladys Charles  AGE: 51 y o  SEX: male  : 1970   MRN: [id number]    DATE: 2019  TIME: 5:17 PM    Assessment and Plan     Problem List Items Addressed This Visit     Well adult exam - Primary      Patient presents for yearly physical/ med check  He is currently 51years old  Overall is feeling well  He still smokes 1 pack per day  He is doing well on all prescribed medications without side effects  He refuses flu shot  He does have a family history of prostate cancer ( his father )  His exam today is essentially unremarkable  Will order routine fasting blood work  (Including PSA due to family history)  Will sent for colonoscopy at age 47      yearly/p r n  Esophagitis, reflux      Doing well on omeprazole 20 mg daily  Patient has attempted step-down therapy in the past without success  Currently doing well on med without any breakthrough         Depression       Doing well on sertraline 75 mg daily without side effects  Mixed hyperlipidemia      Will check labs in near future  Continue diet and exercise  Relevant Orders    Comprehensive metabolic panel    Lipid Panel with Direct LDL reflex    Tobacco use       Still smoking approximately 1 pack per day  Counseled again  Patient states he is found to quit  By the age of 47         Erectile dysfunction       Patient would like a refill on the sildenafil 100 mg    Rx given         Relevant Medications    sildenafil (VIAGRA) 100 mg tablet      Other Visit Diagnoses     Screening for deficiency anemia        Relevant Orders    CBC and differential    Screening for thyroid disorder        Relevant Orders    TSH, 3rd generation with Free T4 reflex    Gastroesophageal reflux disease, esophagitis presence not specified        Screening for prostate cancer        Relevant Orders    PSA, Total Screen              Return to office in:  Rx given for yearly fasting blood work( including PSA due to family history) at [facility]  Will call with results     patient refuses flu shot     YEARLY/P R N  Chief Complaint     Chief Complaint   Patient presents with    Physical Exam     annual physical with no recent labs       History of Present Illness      Patient presents for 48-year-old physical examination today  Overall he is doing well without complaints  Doing well on sertraline for depression  Still smoking approximately 1 pack per day, but is looking to quit in the near future  Doing well on omeprazole 20 mg daily without breakthrough symptoms  The following portions of the patient's history were reviewed and updated as appropriate: allergies, current medications, past family history, past medical history, past social history, past surgical history and problem list     Review of Systems   Review of Systems   Constitutional: Negative  HENT: Negative  Respiratory: Negative  Cardiovascular: Negative  Gastrointestinal: Negative  Genitourinary: Negative  Musculoskeletal: Negative  Active Problem List     Patient Active Problem List   Diagnosis    Well adult exam    Esophagitis, reflux    Depression    Mixed hyperlipidemia    Tobacco use    Fatigue    Benign prostatic hyperplasia without lower urinary tract symptoms    Chronic migraine    Arthralgia    Erectile dysfunction       Objective   /80 (BP Location: Left arm, Patient Position: Sitting, Cuff Size: Adult)   Pulse 89   Temp 98 °F (36 7 °C) (Tympanic)   Resp 18   Ht 5' 9" (1 753 m)   Wt 84 1 kg (185 lb 8 oz)   SpO2 98%   BMI 27 39 kg/m²     Physical Exam   Constitutional: He is oriented to person, place, and time  He appears well-developed and well-nourished  HENT:   Head: Normocephalic and atraumatic  Right Ear: External ear normal    Left Ear: External ear normal    Mouth/Throat: Oropharynx is clear and moist    Eyes: Pupils are equal, round, and reactive to light     Neck: Normal range of motion  Neck supple  Cardiovascular: Normal rate, regular rhythm and normal heart sounds  Pulmonary/Chest: Effort normal and breath sounds normal    Abdominal: Soft  Bowel sounds are normal    Neurological: He is alert and oriented to person, place, and time  He has normal reflexes  Psychiatric: He has a normal mood and affect  His behavior is normal  Judgment and thought content normal    Nursing note and vitals reviewed        Pertinent Laboratory/Diagnostic Studies:    Labs 2018th    Current Medications     Current Outpatient Medications:     butalbital-acetaminophen-caffeine (FIORICET,ESGIC) -40 mg per tablet, Take 1 tablet by mouth every 6 (six) hours as needed for headaches, Disp: 30 tablet, Rfl: 0    omeprazole (PriLOSEC) 20 mg delayed release capsule, Take 1 capsule (20 mg total) by mouth daily, Disp: 90 capsule, Rfl: 0    sertraline (ZOLOFT) 50 mg tablet, Take 1 1/2 tablet by mouth daily, Disp: 135 tablet, Rfl: 1    sildenafil (VIAGRA) 100 mg tablet, Take 1 tablet (100 mg total) by mouth daily as needed for erectile dysfunction, Disp: 5 tablet, Rfl: 2    Health Maintenance     Health Maintenance   Topic Date Due    Pneumococcal Vaccine: Pediatrics (0 to 5 Years) and At-Risk Patients (6 to 59 Years) (1 of 1 - PPSV23) 12/28/1975    BMI: Followup Plan  12/28/1987    BMI: Adult  12/28/1987    HIV Screening  12/27/2019 (Originally 12/28/1984)    Influenza Vaccine  01/22/2020 (Originally 7/1/2019)    DTaP,Tdap,and Td Vaccines (2 - Td) 03/29/2026    Pneumococcal Vaccine: 65+ Years (1 of 2 - PCV13) 12/28/2034    HIB Vaccine  Aged Out    Hepatitis B Vaccine  Aged Out    IPV Vaccine  Aged Out    Hepatitis A Vaccine  Aged Out    Meningococcal ACWY Vaccine  Aged Out    HPV Vaccine  Aged Out     Immunization History   Administered Date(s) Administered    Tdap 03/29/2016       DO Andreina Thomas [facility]

## 2019-12-26 NOTE — ASSESSMENT & PLAN NOTE
Still smoking approximately 1 pack per day  Counseled again    Patient states he is found to quit  By the age of 47

## 2019-12-26 NOTE — ASSESSMENT & PLAN NOTE
Doing well on omeprazole 20 mg daily  Patient has attempted step-down therapy in the past without success    Currently doing well on med without any breakthrough

## 2019-12-26 NOTE — ASSESSMENT & PLAN NOTE
Patient presents for yearly physical/ med check  He is currently 51years old  Overall is feeling well  He still smokes 1 pack per day  He is doing well on all prescribed medications without side effects  He refuses flu shot  He does have a family history of prostate cancer ( his father )  His exam today is essentially unremarkable  Will order routine fasting blood work  (Including PSA due to family history)  Will sent for colonoscopy at age 47      yearly/p r n

## 2019-12-28 ENCOUNTER — APPOINTMENT (OUTPATIENT)
Dept: LAB | Facility: MEDICAL CENTER | Age: 49
End: 2019-12-28
Payer: COMMERCIAL

## 2019-12-28 DIAGNOSIS — Z13.29 SCREENING FOR THYROID DISORDER: ICD-10-CM

## 2019-12-28 DIAGNOSIS — E78.2 MIXED HYPERLIPIDEMIA: ICD-10-CM

## 2019-12-28 DIAGNOSIS — Z12.5 SCREENING FOR PROSTATE CANCER: ICD-10-CM

## 2019-12-28 DIAGNOSIS — Z13.0 SCREENING FOR DEFICIENCY ANEMIA: ICD-10-CM

## 2019-12-28 LAB
ALBUMIN SERPL BCP-MCNC: 3.9 G/DL (ref 3.5–5)
ALP SERPL-CCNC: 48 U/L (ref 46–116)
ALT SERPL W P-5'-P-CCNC: 29 U/L (ref 12–78)
ANION GAP SERPL CALCULATED.3IONS-SCNC: 5 MMOL/L (ref 4–13)
AST SERPL W P-5'-P-CCNC: 22 U/L (ref 5–45)
BASOPHILS # BLD AUTO: 0.08 THOUSANDS/ΜL (ref 0–0.1)
BASOPHILS NFR BLD AUTO: 1 % (ref 0–1)
BILIRUB SERPL-MCNC: 0.53 MG/DL (ref 0.2–1)
BUN SERPL-MCNC: 22 MG/DL (ref 5–25)
CALCIUM SERPL-MCNC: 9.2 MG/DL (ref 8.3–10.1)
CHLORIDE SERPL-SCNC: 107 MMOL/L (ref 100–108)
CHOLEST SERPL-MCNC: 202 MG/DL (ref 50–200)
CO2 SERPL-SCNC: 28 MMOL/L (ref 21–32)
CREAT SERPL-MCNC: 0.93 MG/DL (ref 0.6–1.3)
EOSINOPHIL # BLD AUTO: 0.17 THOUSAND/ΜL (ref 0–0.61)
EOSINOPHIL NFR BLD AUTO: 2 % (ref 0–6)
ERYTHROCYTE [DISTWIDTH] IN BLOOD BY AUTOMATED COUNT: 13.2 % (ref 11.6–15.1)
GFR SERPL CREATININE-BSD FRML MDRD: 95 ML/MIN/1.73SQ M
GLUCOSE P FAST SERPL-MCNC: 95 MG/DL (ref 65–99)
HCT VFR BLD AUTO: 47.1 % (ref 36.5–49.3)
HDLC SERPL-MCNC: 37 MG/DL
HGB BLD-MCNC: 15.6 G/DL (ref 12–17)
IMM GRANULOCYTES # BLD AUTO: 0.04 THOUSAND/UL (ref 0–0.2)
IMM GRANULOCYTES NFR BLD AUTO: 1 % (ref 0–2)
LDLC SERPL CALC-MCNC: 143 MG/DL (ref 0–100)
LYMPHOCYTES # BLD AUTO: 1.79 THOUSANDS/ΜL (ref 0.6–4.47)
LYMPHOCYTES NFR BLD AUTO: 24 % (ref 14–44)
MCH RBC QN AUTO: 31 PG (ref 26.8–34.3)
MCHC RBC AUTO-ENTMCNC: 33.1 G/DL (ref 31.4–37.4)
MCV RBC AUTO: 94 FL (ref 82–98)
MONOCYTES # BLD AUTO: 0.85 THOUSAND/ΜL (ref 0.17–1.22)
MONOCYTES NFR BLD AUTO: 11 % (ref 4–12)
NEUTROPHILS # BLD AUTO: 4.67 THOUSANDS/ΜL (ref 1.85–7.62)
NEUTS SEG NFR BLD AUTO: 61 % (ref 43–75)
NRBC BLD AUTO-RTO: 0 /100 WBCS
PLATELET # BLD AUTO: 267 THOUSANDS/UL (ref 149–390)
PMV BLD AUTO: 9.5 FL (ref 8.9–12.7)
POTASSIUM SERPL-SCNC: 4.4 MMOL/L (ref 3.5–5.3)
PROT SERPL-MCNC: 7.3 G/DL (ref 6.4–8.2)
PSA SERPL-MCNC: 0.7 NG/ML (ref 0–4)
RBC # BLD AUTO: 5.03 MILLION/UL (ref 3.88–5.62)
SODIUM SERPL-SCNC: 140 MMOL/L (ref 136–145)
TRIGL SERPL-MCNC: 112 MG/DL
TSH SERPL DL<=0.05 MIU/L-ACNC: 1.5 UIU/ML (ref 0.36–3.74)
WBC # BLD AUTO: 7.6 THOUSAND/UL (ref 4.31–10.16)

## 2019-12-28 PROCEDURE — 36415 COLL VENOUS BLD VENIPUNCTURE: CPT

## 2019-12-28 PROCEDURE — G0103 PSA SCREENING: HCPCS

## 2019-12-28 PROCEDURE — 80053 COMPREHEN METABOLIC PANEL: CPT

## 2019-12-28 PROCEDURE — 85025 COMPLETE CBC W/AUTO DIFF WBC: CPT

## 2019-12-28 PROCEDURE — 84443 ASSAY THYROID STIM HORMONE: CPT

## 2019-12-28 PROCEDURE — 80061 LIPID PANEL: CPT

## 2020-02-23 DIAGNOSIS — F32.A DEPRESSION, UNSPECIFIED DEPRESSION TYPE: ICD-10-CM

## 2020-05-18 DIAGNOSIS — F32.A DEPRESSION, UNSPECIFIED DEPRESSION TYPE: ICD-10-CM

## 2020-05-18 DIAGNOSIS — K21.9 GASTROESOPHAGEAL REFLUX DISEASE, ESOPHAGITIS PRESENCE NOT SPECIFIED: ICD-10-CM

## 2020-05-18 RX ORDER — OMEPRAZOLE 20 MG/1
20 CAPSULE, DELAYED RELEASE ORAL DAILY
Qty: 90 CAPSULE | Refills: 0 | Status: SHIPPED | OUTPATIENT
Start: 2020-05-18 | End: 2020-11-15

## 2020-07-01 DIAGNOSIS — R39.198 DIFFICULTY IN URINATION: Primary | ICD-10-CM

## 2020-07-06 DIAGNOSIS — N40.0 BENIGN PROSTATIC HYPERPLASIA WITHOUT LOWER URINARY TRACT SYMPTOMS: Primary | ICD-10-CM

## 2020-07-06 RX ORDER — TAMSULOSIN HYDROCHLORIDE 0.4 MG/1
0.4 CAPSULE ORAL
Qty: 30 CAPSULE | Refills: 3 | Status: SHIPPED | OUTPATIENT
Start: 2020-07-06 | End: 2020-08-12

## 2020-07-06 NOTE — TELEPHONE ENCOUNTER
Last OV 12/26/19  No future appts    Patient is requesting to restart taking Flomax for frequent urination

## 2020-07-08 ENCOUNTER — TELEPHONE (OUTPATIENT)
Dept: FAMILY MEDICINE CLINIC | Facility: CLINIC | Age: 50
End: 2020-07-08

## 2020-07-08 NOTE — TELEPHONE ENCOUNTER
Patient is having difficulty urinating  Dr Misty Tripp placed a referral in patients chart to see your office however he has not heard anything  Can someone please call patient as soon as possible to schedule him an appointment      Thank you so much!!

## 2020-07-08 NOTE — TELEPHONE ENCOUNTER
Task sent to [facility] to call patient to schedule an appointment

## 2020-07-08 NOTE — TELEPHONE ENCOUNTER
----- Message from Madyson Frank DO sent at 2020 12:42 PM EDT -----  Regarding: FW: RE: Non-Urgent Medical Question  Contact: [phone]   I referred patient to see a urologist   To date,  They still not contacted him  Please call them to set up an appointment for patient  ----- Message -----  From: Hrenan Blanca MA  Sent: 2020   2:56 PM EDT  To: Madyson Frank DO  Subject: FW: RE: Non-Urgent Medical Question                  ----- Message -----  From: Dwaine Danielle  Sent: 2020   2:55 PM EDT  To: [facility]  Subject: RE: RE: Non-Urgent Medical Question              Vedia Acre here just wanted to let you know that no one from the urologist has contacted me yet  Thanks you  Zarina Appiah    ----- Message -----  From: Madyson Frank DO  Sent: 20 5:28 PM  To: Dwaine Danielle  Subject: RE: Non-Urgent Medical Question    Vedia Acre,     I would like to send you to a urologist   I will have them contact you to set up an appointment  Shi Jorge    ----- Message -----     From: Dwaine Danielle     Sent: 2020  4:15 PM EDT       To: Madyson Frank DO  Subject: Non-Urgent Austen Starks Rounds here  Quick question  I'm experiencing pain in one of my testicle, just like I did in the past  Plus the pills you gave to me, viagra, do not do anything for me at all, and when I do ejaculate, hardly any seman comes out, like there's nothing in there  Do I need to come see you again or should I go see someone else? I did have ultra sound done on my testicle over 6 years ago and came back ok  I know somethings going on down there  Almost like the tubes get twisted around my testicle or something  Ok thanks for your time  I will watch for your response  Its quite uncomfortable   lol   the pain that comes and goes  Thanks Dr Мария Gipson 1970

## 2020-07-13 ENCOUNTER — TELEPHONE (OUTPATIENT)
Dept: UROLOGY | Facility: MEDICAL CENTER | Age: 50
End: 2020-07-13

## 2020-07-13 ENCOUNTER — TELEMEDICINE (OUTPATIENT)
Dept: UROLOGY | Facility: MEDICAL CENTER | Age: 50
End: 2020-07-13
Payer: COMMERCIAL

## 2020-07-13 DIAGNOSIS — R39.198 DIFFICULTY IN URINATION: Primary | ICD-10-CM

## 2020-07-13 DIAGNOSIS — N53.14 RETROGRADE EJACULATION: ICD-10-CM

## 2020-07-13 DIAGNOSIS — Z80.42 FAMILY HISTORY OF PROSTATE CANCER IN FATHER: ICD-10-CM

## 2020-07-13 DIAGNOSIS — N52.9 ERECTILE DYSFUNCTION, UNSPECIFIED ERECTILE DYSFUNCTION TYPE: ICD-10-CM

## 2020-07-13 DIAGNOSIS — N50.819 TESTICULAR PAIN: ICD-10-CM

## 2020-07-13 DIAGNOSIS — R10.2 PELVIC PAIN: ICD-10-CM

## 2020-07-13 PROCEDURE — 99244 OFF/OP CNSLTJ NEW/EST MOD 40: CPT | Performed by: UROLOGY

## 2020-07-13 NOTE — PROGRESS NOTES
Virtual Regular Visit      Assessment/Plan:  1  Difficulty in urination-primarily intermittent sensations of urgency and incomplete bladder emptying with lower abdominal/pelvic discomfort  No change in bowel habits although patient feels bowel urgency is well-plan CT of pelvis, eventual cystoscopy and measurement of uroflow and PVR in office    2  Family history of prostate cancer in father-father doing well with no evidence of disease recurrence after treatment  Latest PSA for patient 0 7, last year 0 9  Plan SELENE and following PSA levels along with SELENE sequentially over years  3   Erectile dysfunction-etiology unknown although the patient notes this to be intermittent over number of years previously responding to sildenafil 100 mg on an as-needed basis  The patient now notes that he is unable to respond to sildenafil noting increased difficulty obtaining erection and ejaculatory abnormality since being placed on tamsulosin check testosterone panel    4  Retrograde ejaculation-most likely secondary to tamsulosin    5  Pelvic pain/testicular pain-vague chronic lower abdominal suprapubic crampy pain not associated with urinary or bowel symptoms  Present over long-term  Testicular pain also intermittent with previous workup negative  -repeat scrotal ultrasound and physical examination  Problem List Items Addressed This Visit        Other    Erectile dysfunction      Other Visit Diagnoses     Difficulty in urination    -  Primary    Primarily urgency    Family history of prostate cancer in father        Last PSA 0 7    Retrograde ejaculation        Secondary to tamsulosin most likely    Pelvic pain        Chronic intermittent discomfort in lower abdomen just above genitalia    Testicular pain        Bilateral testicular pain intermittently without change in palpable texture of testes for many years with 2016 scrotal ultrasound normal                Reason for visit is No chief complaint on file  Encounter provider Cliff Davidson MD    Provider located at [facility] [address] [address]      Recent Visits  No visits were found meeting these conditions  Showing recent visits within past 7 days and meeting all other requirements     Future Appointments  No visits were found meeting these conditions  Showing future appointments within next 150 days and meeting all other requirements        The patient was identified by name and date of birth  Cailin Robbins was informed that this is a telemedicine visit and that the visit is being conducted through [employer] and patient was informed that this is not a secure, HIPAA-complaint platform  He agrees to proceed     My office door was closed  No one else was in the room  He acknowledged consent and understanding of privacy and security of the video platform  The patient has agreed to participate and understands they can discontinue the visit at any time  Patient is aware this is a billable service  Subjective-history of present Ioana Brunson is a 47 y o  male who presents for evaluation of multiple urologic complaints  The patient notes a multiple year history of on and off suprapubic discomfort of the abdomen not related to specific changes in voiding or bowel movements  He notes normal bowel movements but does note some degree of rectal urgency from time to time  He notes feelings of urinary urgency and incomplete bladder emptying from time to time as well as fleeting testicular pain not associated with changes in the quality on palpation of the testes  The patient denies dysuria gross hematuria incontinence but notes recently and increase in nocturia as well the 3-4 times per night when usually he only voids 0-1 time per night    The patient denies any antecedent trauma notes longstanding intermittent erectile dysfunction which since starting tamsulosin has not been responsive to sildenafil 100 mg  The patient presents for evaluation   Past Medical History:   Diagnosis Date    Alcoholism ([facility] [address] )     Cellulitis of buccal space of mouth     Last assessed: 4/17/2017    Dog bite of multiple sites     Last assessed: 4/6/2016    Eczema     Last assessed: 12/7/2012    Epididymitis     Last assessed: 9/18/2016    Hypertension     Last assessed: 7/7/2015    Tinea corporis     Last assessed: 11/15/2013       Past Surgical History:   Procedure Laterality Date    APPENDECTOMY      ELBOW SURGERY      HAND SURGERY      KNEE SURGERY      SHOULDER SURGERY         Current Outpatient Medications   Medication Sig Dispense Refill    butalbital-acetaminophen-caffeine (FIORICET,ESGIC) -40 mg per tablet Take 1 tablet by mouth every 6 (six) hours as needed for headaches 30 tablet 0    omeprazole (PriLOSEC) 20 mg delayed release capsule Take 1 capsule (20 mg total) by mouth daily 90 capsule 0    sertraline (ZOLOFT) 50 mg tablet take 1 & 1/2 tablets by mouth daily 135 tablet 1    sildenafil (VIAGRA) 100 mg tablet Take 1 tablet (100 mg total) by mouth daily as needed for erectile dysfunction 5 tablet 2    tamsulosin (FLOMAX) 0 4 mg Take 1 capsule (0 4 mg total) by mouth daily with dinner 30 capsule 3     No current facility-administered medications for this visit  Allergies   Allergen Reactions    Aspirin Vomiting    Erythromycin Rash    Penicillins Rash       Review of Systems   Gastrointestinal: Negative for abdominal pain  See HPI   Genitourinary: Positive for penile pain, testicular pain and urgency  See HPI   Musculoskeletal: Positive for myalgias  All other systems reviewed and are negative  Video Exam    There were no vitals filed for this visit  Physical Exam   Constitutional: He is oriented to person, place, and time  He appears well-developed and well-nourished  No distress     HENT:   Head: Normocephalic and atraumatic  Eyes: EOM are normal    Neck: Normal range of motion  Pulmonary/Chest: Effort normal    Neurological: He is alert and oriented to person, place, and time  Skin: He is not diaphoretic  Psychiatric: He has a normal mood and affect  His behavior is normal  Judgment and thought content normal         I spent 30 minutes directly with the patient during this visit      Marlon Greer acknowledges that he has consented to an online visit or consultation  He understands that the online visit is based solely on information provided by him, and that, in the absence of a face-to-face physical evaluation by the physician, the diagnosis he receives is both limited and provisional in terms of accuracy and completeness  This is not intended to replace a full medical face-to-face evaluation by the physician  Katya Decker understands and accepts these terms

## 2020-07-13 NOTE — TELEPHONE ENCOUNTER
Lm for pt re: checkout from virtual visit today with dr Kiah Pruitt  Pt needs to followup in one month for cysto/uroflow/pvr  Left office # for pt to call back to schedule appt (advised pt of need for full bladder for uroflow test)  Also advised pt dr ordered urine testing and bloodwork which will be in his chart as long as he goes to  [email or web address] lab  If he goes elsewhere, instructed him to call our office so we can send him the scripts

## 2020-07-13 NOTE — PATIENT INSTRUCTIONS
CT abdomen and pelvis and scrotal ultrasound as well as blood in urine testing as ordered  Continue tamsulosin for 1 more week and if no improvement urination discontinue tamsulosin

## 2020-07-15 ENCOUNTER — APPOINTMENT (OUTPATIENT)
Dept: LAB | Facility: MEDICAL CENTER | Age: 50
End: 2020-07-15
Attending: UROLOGY
Payer: COMMERCIAL

## 2020-07-15 DIAGNOSIS — R39.198 DIFFICULTY IN URINATION: ICD-10-CM

## 2020-07-15 DIAGNOSIS — N52.9 ERECTILE DYSFUNCTION, UNSPECIFIED ERECTILE DYSFUNCTION TYPE: ICD-10-CM

## 2020-07-15 LAB
ALBUMIN SERPL BCP-MCNC: 4.7 G/DL (ref 3.5–5)
ALP SERPL-CCNC: 59 U/L (ref 46–116)
ALT SERPL W P-5'-P-CCNC: 27 U/L (ref 12–78)
AMORPH URATE CRY URNS QL MICRO: ABNORMAL /HPF
ANION GAP SERPL CALCULATED.3IONS-SCNC: 4 MMOL/L (ref 4–13)
AST SERPL W P-5'-P-CCNC: 18 U/L (ref 5–45)
BACTERIA UR QL AUTO: ABNORMAL /HPF
BILIRUB SERPL-MCNC: 0.97 MG/DL (ref 0.2–1)
BILIRUB UR QL STRIP: ABNORMAL
BUN SERPL-MCNC: 19 MG/DL (ref 5–25)
CALCIUM SERPL-MCNC: 9.7 MG/DL (ref 8.3–10.1)
CAOX CRY URNS QL MICRO: ABNORMAL /HPF
CHLORIDE SERPL-SCNC: 102 MMOL/L (ref 100–108)
CLARITY UR: ABNORMAL
CO2 SERPL-SCNC: 30 MMOL/L (ref 21–32)
COLOR UR: YELLOW
CREAT SERPL-MCNC: 0.99 MG/DL (ref 0.6–1.3)
GFR SERPL CREATININE-BSD FRML MDRD: 88 ML/MIN/1.73SQ M
GLUCOSE P FAST SERPL-MCNC: 101 MG/DL (ref 65–99)
GLUCOSE UR STRIP-MCNC: NEGATIVE MG/DL
HGB UR QL STRIP.AUTO: NEGATIVE
HYALINE CASTS #/AREA URNS LPF: ABNORMAL /LPF
KETONES UR STRIP-MCNC: ABNORMAL MG/DL
LEUKOCYTE ESTERASE UR QL STRIP: ABNORMAL
NITRITE UR QL STRIP: NEGATIVE
NON-SQ EPI CELLS URNS QL MICRO: ABNORMAL /HPF
PH UR STRIP.AUTO: 6 [PH]
POTASSIUM SERPL-SCNC: 4.7 MMOL/L (ref 3.5–5.3)
PROT SERPL-MCNC: 7.6 G/DL (ref 6.4–8.2)
PROT UR STRIP-MCNC: ABNORMAL MG/DL
RBC #/AREA URNS AUTO: ABNORMAL /HPF
SODIUM SERPL-SCNC: 136 MMOL/L (ref 136–145)
SP GR UR STRIP.AUTO: 1.03 (ref 1–1.03)
UROBILINOGEN UR QL STRIP.AUTO: 1 E.U./DL
WBC #/AREA URNS AUTO: ABNORMAL /HPF

## 2020-07-15 PROCEDURE — 84402 ASSAY OF FREE TESTOSTERONE: CPT

## 2020-07-15 PROCEDURE — 87086 URINE CULTURE/COLONY COUNT: CPT

## 2020-07-15 PROCEDURE — 81001 URINALYSIS AUTO W/SCOPE: CPT

## 2020-07-15 PROCEDURE — 36415 COLL VENOUS BLD VENIPUNCTURE: CPT

## 2020-07-15 PROCEDURE — 80053 COMPREHEN METABOLIC PANEL: CPT

## 2020-07-15 PROCEDURE — 84403 ASSAY OF TOTAL TESTOSTERONE: CPT

## 2020-07-16 LAB — BACTERIA UR CULT: NORMAL

## 2020-07-17 LAB
TESTOST FREE SERPL-MCNC: 9.8 PG/ML (ref 7.2–24)
TESTOST SERPL-MCNC: 446 NG/DL (ref 264–916)

## 2020-07-29 ENCOUNTER — HOSPITAL ENCOUNTER (OUTPATIENT)
Dept: CT IMAGING | Facility: HOSPITAL | Age: 50
Discharge: HOME | End: 2020-07-29
Attending: UROLOGY
Payer: COMMERCIAL

## 2020-07-29 ENCOUNTER — HOSPITAL ENCOUNTER (OUTPATIENT)
Dept: ULTRASOUND IMAGING | Facility: HOSPITAL | Age: 50
Discharge: HOME | End: 2020-07-29
Attending: UROLOGY
Payer: COMMERCIAL

## 2020-07-29 DIAGNOSIS — N50.819 TESTICULAR PAIN: ICD-10-CM

## 2020-07-29 DIAGNOSIS — R10.2 PELVIC PAIN: ICD-10-CM

## 2020-07-29 PROCEDURE — 74178 CT ABD&PLV WO CNTR FLWD CNTR: CPT

## 2020-07-29 PROCEDURE — 76870 US EXAM SCROTUM: CPT

## 2020-07-29 RX ADMIN — IOHEXOL 100 ML: 350 INJECTION, SOLUTION INTRAVENOUS at 13:12

## 2020-08-12 ENCOUNTER — OFFICE VISIT (OUTPATIENT)
Dept: UROLOGY | Facility: MEDICAL CENTER | Age: 50
End: 2020-08-12
Payer: COMMERCIAL

## 2020-08-12 VITALS
DIASTOLIC BLOOD PRESSURE: 62 MMHG | HEIGHT: 69 IN | TEMPERATURE: 98.5 F | BODY MASS INDEX: 26.93 KG/M2 | WEIGHT: 181.8 LBS | SYSTOLIC BLOOD PRESSURE: 124 MMHG

## 2020-08-12 DIAGNOSIS — Z80.42 FAMILY HISTORY OF PROSTATE CANCER IN FATHER: ICD-10-CM

## 2020-08-12 DIAGNOSIS — N20.0 CALCULUS OF KIDNEY: ICD-10-CM

## 2020-08-12 DIAGNOSIS — N52.03 COMBINED ARTERIAL INSUFFICIENCY AND CORPORO-VENOUS OCCLUSIVE ERECTILE DYSFUNCTION: Primary | ICD-10-CM

## 2020-08-12 DIAGNOSIS — R35.0 URINARY FREQUENCY: ICD-10-CM

## 2020-08-12 DIAGNOSIS — N40.0 BENIGN PROSTATIC HYPERPLASIA WITHOUT LOWER URINARY TRACT SYMPTOMS: ICD-10-CM

## 2020-08-12 LAB
POST-VOID RESIDUAL VOLUME, ML POC: 12 ML
SL AMB  POCT GLUCOSE, UA: NORMAL
SL AMB LEUKOCYTE ESTERASE,UA: NORMAL
SL AMB POCT BILIRUBIN,UA: NORMAL
SL AMB POCT BLOOD,UA: NORMAL
SL AMB POCT CLARITY,UA: CLEAR
SL AMB POCT COLOR,UA: YELLOW
SL AMB POCT KETONES,UA: NORMAL
SL AMB POCT NITRITE,UA: NORMAL
SL AMB POCT PH,UA: 6
SL AMB POCT SPECIFIC GRAVITY,UA: 1.01
SL AMB POCT URINE PROTEIN: NORMAL
SL AMB POCT UROBILINOGEN: 0.2

## 2020-08-12 PROCEDURE — 99214 OFFICE O/P EST MOD 30 MIN: CPT | Performed by: UROLOGY

## 2020-08-12 PROCEDURE — 51798 US URINE CAPACITY MEASURE: CPT | Performed by: UROLOGY

## 2020-08-12 PROCEDURE — 81003 URINALYSIS AUTO W/O SCOPE: CPT | Performed by: UROLOGY

## 2020-08-12 PROCEDURE — 3008F BODY MASS INDEX DOCD: CPT | Performed by: UROLOGY

## 2020-08-12 RX ORDER — CLINDAMYCIN HYDROCHLORIDE 150 MG/1
150 CAPSULE ORAL EVERY 6 HOURS
COMMUNITY
Start: 2020-06-09 | End: 2020-09-10

## 2020-08-12 RX ORDER — TADALAFIL 5 MG/1
5 TABLET ORAL DAILY
Qty: 30 TABLET | Refills: 1 | Status: SHIPPED | OUTPATIENT
Start: 2020-08-12 | End: 2020-11-23

## 2020-08-12 RX ORDER — METHYLPREDNISOLONE ACETATE 40 MG/ML
INJECTION, SUSPENSION INTRA-ARTICULAR; INTRALESIONAL; INTRAMUSCULAR; SOFT TISSUE
COMMUNITY

## 2020-08-12 NOTE — PROGRESS NOTES
Assessment/Plan:   1  Urinary frequency /BPH with lower urinary tract symptoms -current AUA symptom score is 11 with urgency and frequency both 3/5  Plan cystourethroscopy  Patient currently off of tamsulosin with no change in his voiding pattern   2  Family history of prostate cancer in his father -PSA and SELENE not suspicious for malignancy  Repeat PSA and SELENE 1 year  3  Erectile dysfunction etiology unknown although the patient is a cigarette smoker and more than likely this is a vascular problem  Patient has failed to respond to sildenafil 100 mg on an as needed basis  He will try Cialis 20 mg followed by Cialis 5 mg p o  daily and return to the office in 6 weeks for further discussion  4  Lower abdominal pain - no CT etiology for this  Patient does have diverticulosis without diverticulitis  5  Asymptomatic renal stone on CT -no current intervent  No problem-specific Assessment & Plan notes found for this encounter  Diagnoses and all orders for this visit:    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 dysfunction  -     tadalafil (CIALIS) 5 MG tablet; Take 1 tablet (5 mg total) by mouth daily  -     PROSTAGLANDIN PGE1 INJECTABLE 20 MCG/ML, STANDARD, IJ SOLN; 1 mL by Intracavernosal route once for 1 dose    Urinary frequency  -     POCT urine dip auto non-scope  -     POCT Measure PVR  -     Cystoscopy; Future    Family history of prostate cancer in father    Benign prostatic hyperplasia without lower urinary tract symptoms    Calculus of kidney    Other orders  -     clindamycin (CLEOCIN) 150 mg capsule; Take 150 mg by mouth every 6 (six) hours  -     methylPREDNISolone acetate (DEPO-MEDROL) 40 mg/mL injection; methylprednisolone acetate 40 mg/mL suspension for injection   in conjunction with Lidocaine          Subjective:      Patient ID: Sherie Soto is a 47 y o  male      HPI    79-year-old male 1 pack of the day cigarette smoker with a multiple year progressive history of erectile dysfunction initially responsive to  Sildenafil 100 mg but lately not responsive to that  The patient presents for evaluation and treatment  His testosterone panel is within acceptable limits  The patient also notes urinary urgency and frequency and has a family history of prostate cancer  The patient presents for evaluation denying dysuria gross hematuria  The following portions of the patient's history were reviewed and updated as appropriate: allergies, current medications, past family history, past medical history, past social history, past surgical history and problem list     Review of Systems   All other systems reviewed and are negative  Objective:      /62   Temp 98 5 °F (36 9 °C)   Ht 5' 9" (1 753 m)   Wt 82 5 kg (181 lb 12 8 oz)   BMI 26 85 kg/m²          Physical Exam  Vitals signs reviewed  Constitutional:       Appearance: Normal appearance  HENT:      Head: Normocephalic and atraumatic  Eyes:      Extraocular Movements: Extraocular movements intact  Neck:      Musculoskeletal: Normal range of motion and neck supple  Cardiovascular:      Rate and Rhythm: Normal rate and regular rhythm  Pulmonary:      Effort: Pulmonary effort is normal  No respiratory distress  Breath sounds: Normal breath sounds  No wheezing, rhonchi or rales  Abdominal:      Palpations: Abdomen is soft  Genitourinary:     Comments: Phallus normal circumcised without cutaneous lesions  Meatus patent normally placed  Scrotum normal without cutaneous lesions  Testes adnexa palpably normal without masses or palpable fluid collections or tenderness  SELENE normal anal verge normal anal sphincter tone no palpable rectal masses  40 g a nodular nontender prostate  Musculoskeletal: Normal range of motion  Skin:     General: Skin is dry  Neurological:      General: No focal deficit present  Mental Status: He is alert and oriented to person, place, and time  Psychiatric:         Mood and Affect: Mood normal          Behavior: Behavior normal          Thought Content:  Thought content normal          Judgment: Judgment normal

## 2020-08-24 DIAGNOSIS — R93.1 ELEVATED CORONARY ARTERY CALCIUM SCORE: Primary | ICD-10-CM

## 2020-09-10 ENCOUNTER — CONSULT (OUTPATIENT)
Dept: CARDIOLOGY CLINIC | Facility: CLINIC | Age: 50
End: 2020-09-10
Payer: COMMERCIAL

## 2020-09-10 VITALS
HEART RATE: 73 BPM | TEMPERATURE: 98.6 F | WEIGHT: 189.4 LBS | BODY MASS INDEX: 28.05 KG/M2 | HEIGHT: 69 IN | SYSTOLIC BLOOD PRESSURE: 120 MMHG | DIASTOLIC BLOOD PRESSURE: 72 MMHG

## 2020-09-10 DIAGNOSIS — E78.2 MIXED HYPERLIPIDEMIA: ICD-10-CM

## 2020-09-10 DIAGNOSIS — Z72.0 TOBACCO USE: ICD-10-CM

## 2020-09-10 DIAGNOSIS — R93.1 AGATSTON CORONARY ARTERY CALCIUM SCORE GREATER THAN 400: Primary | ICD-10-CM

## 2020-09-10 DIAGNOSIS — R06.00 DYSPNEA ON EXERTION: ICD-10-CM

## 2020-09-10 DIAGNOSIS — Z71.6 TOBACCO ABUSE COUNSELING: ICD-10-CM

## 2020-09-10 DIAGNOSIS — I25.10 ASCVD (ARTERIOSCLEROTIC CARDIOVASCULAR DISEASE): ICD-10-CM

## 2020-09-10 PROCEDURE — 99244 OFF/OP CNSLTJ NEW/EST MOD 40: CPT | Performed by: INTERNAL MEDICINE

## 2020-09-10 PROCEDURE — 99406 BEHAV CHNG SMOKING 3-10 MIN: CPT | Performed by: INTERNAL MEDICINE

## 2020-09-10 PROCEDURE — 4004F PT TOBACCO SCREEN RCVD TLK: CPT | Performed by: INTERNAL MEDICINE

## 2020-09-10 PROCEDURE — 93000 ELECTROCARDIOGRAM COMPLETE: CPT | Performed by: INTERNAL MEDICINE

## 2020-09-10 RX ORDER — ATORVASTATIN CALCIUM 40 MG/1
40 TABLET, FILM COATED ORAL DAILY
Qty: 90 TABLET | Refills: 2 | Status: SHIPPED | OUTPATIENT
Start: 2020-09-10 | End: 2021-06-01

## 2020-09-10 NOTE — PROGRESS NOTES
Cardiology Office Note  MD Dennis Vergara MD Kelsey Churches, DO, MD Paul Gaston DO, Edson Vazquez DO, [facility] [place]  ----------------------------------------------------------------  [address]  [address]  [place], [address] [facility]    Yon Adams 47 y o  male MRN: [id number]  Unit/Bed#:  Encounter: [id number]      History of Present Illness: It was a pleasure to see Yon Adams in the office today for initial CV evaluation  He is here today for establishing care after he has been having recent screening at [facility]   He had screening CT scans including a coronary calcium score  A coronary calcium score demonstrated an number of 648  Following these results, he is here for follow-up  Overall, he reports to feeling well aside from some dyspnea on exertion that he experiences with climbing stairs  He believes this is likely related to his tobacco use that he has been having for many years  He denies any chest pain, pressure, tightness or squeezing  Denies lower extremity swelling, orthopnea or paroxysmal nocturnal dyspnea  He has had no lightheadedness, dizziness or palpitations  He has no personal history of heart failure that he is aware of  Previously, he had used alcohol, but quit 13 years ago  At that time, his blood pressure was elevated but now that he has stopped using alcohol his blood pressure is much improved when he takes it at home and in other doctor's offices  He is here today to establish next steps with regard to this coronary calcium score  Review of Systems:  Review of Systems   Constitution: Negative for decreased appetite, fever, weight gain and weight loss  HENT: Negative for congestion and sore throat  Eyes: Negative for visual disturbance  Cardiovascular: Positive for dyspnea on exertion  Negative for chest pain, leg swelling, near-syncope and palpitations     Respiratory: Positive for shortness of breath  Negative for cough  Hematologic/Lymphatic: Negative for bleeding problem  Skin: Negative for rash  Musculoskeletal: Negative for myalgias and neck pain  Gastrointestinal: Negative for abdominal pain and nausea  Neurological: Negative for light-headedness and weakness  Psychiatric/Behavioral: Negative for depression         Past Medical History:   Diagnosis Date    Alcoholism ([facility] [address] )     Cellulitis of buccal space of mouth     Last assessed: 4/17/2017    Dog bite of multiple sites     Last assessed: 4/6/2016    Eczema     Last assessed: 12/7/2012    Epididymitis     Last assessed: 9/18/2016    Hypertension     Last assessed: 7/7/2015    Tinea corporis     Last assessed: 11/15/2013       Past Surgical History:   Procedure Laterality Date    APPENDECTOMY      ELBOW SURGERY      HAND SURGERY      KNEE SURGERY      SHOULDER SURGERY         Social History     Socioeconomic History    Marital status: /Civil Union     Spouse name: None    Number of children: None    Years of education: None    Highest education level: None   Occupational History    None   Social Needs    Financial resource strain: None    Food insecurity     Worry: None     Inability: None    Transportation needs     Medical: None     Non-medical: None   Tobacco Use    Smoking status: Current Every Day Smoker    Smokeless tobacco: Never Used   Substance and Sexual Activity    Alcohol use: No     Comment: Stopped drinking alcohol    Drug use: None    Sexual activity: None   Lifestyle    Physical activity     Days per week: None     Minutes per session: None    Stress: None   Relationships    Social connections     Talks on phone: None     Gets together: None     Attends [employer] service: None     Active member of club or organization: None     Attends meetings of clubs or organizations: None     Relationship status: None    Intimate partner violence     Fear of current or ex partner: None Emotionally abused: None     Physically abused: None     Forced sexual activity: None   Other Topics Concern    None   Social History Narrative    None       Family History   Problem Relation Age of Onset    Other Mother         Solitary kidney    Prostate cancer Father     Substance Abuse Neg Hx        Allergies   Allergen Reactions    Aspirin Vomiting    Erythromycin Rash    Penicillins Rash         Current Outpatient Medications:     butalbital-acetaminophen-caffeine (FIORICET,ESGIC) -40 mg per tablet, Take 1 tablet by mouth every 6 (six) hours as needed for headaches, Disp: 30 tablet, Rfl: 0    methylPREDNISolone acetate (DEPO-MEDROL) 40 mg/mL injection, methylprednisolone acetate 40 mg/mL suspension for injection  in conjunction with Lidocaine, Disp: , Rfl:     omeprazole (PriLOSEC) 20 mg delayed release capsule, Take 1 capsule (20 mg total) by mouth daily, Disp: 90 capsule, Rfl: 0    sertraline (ZOLOFT) 50 mg tablet, take 1 & 1/2 tablets by mouth daily, Disp: 135 tablet, Rfl: 1    tadalafil (CIALIS) 5 MG tablet, Take 1 tablet (5 mg total) by mouth daily, Disp: 30 tablet, Rfl: 1    atorvastatin (LIPITOR) 40 mg tablet, Take 1 tablet (40 mg total) by mouth daily, Disp: 90 tablet, Rfl: 2    Vitals:    09/10/20 1614   BP: 120/72   Pulse: 73   Temp: 98 6 °F (37 °C)   Weight: 85 9 kg (189 lb 6 4 oz)   Height: 5' 9" (1 753 m)       PHYSICAL EXAMINATION:  Gen: Awake, Alert, NAD    HEENT: AT/NC, Anicteric, mmm  Neck: Supple, No elevated JVP  Resp: Decreased BS bilaterally otherwise  CV: RRR +S1, S2, No m/r/g  Abd: Soft, NT/ND + BS  Ext: warm, no LE edema bilaterally  Neuro:  Follows commands, moves all extermities  Psych: Appropriate affect    --------------------------------------------------------------------------------  TREADMILL STRESS  No results found for this or any previous visit    --------------------------------------------------------------------------------  NUCLEAR STRESS TEST: No results found for this or any previous visit  No results found for this or any previous visit     --------------------------------------------------------------------------------  CATH:  No results found for this or any previous visit   --------------------------------------------------------------------------------  ECHO:   No results found for this or any previous visit  No results found for this or any previous visit   --------------------------------------------------------------------------------  HOLTER  No results found for this or any previous visit  No results found for this or any previous visit   --------------------------------------------------------------------------------  CAROTIDS  No results found for this or any previous visit    --------------------------------------------------------------------------------  ECGs:  Results for orders placed or performed in visit on 09/10/20   POCT ECG    Impression    Sinus rhythm 73 beats per minute, normal ECG        Lab Results   Component Value Date    WBC 7 60 12/28/2019    HGB 15 6 12/28/2019    HCT 47 1 12/28/2019    MCV 94 12/28/2019     12/28/2019      Lab Results   Component Value Date    SODIUM 136 07/15/2020    K 4 7 07/15/2020     07/15/2020    CO2 30 07/15/2020    BUN 19 07/15/2020    CREATININE 0 99 07/15/2020    CALCIUM 9 7 07/15/2020      No results found for: HGBA1C   Lab Results   Component Value Date    CHOL 186 03/26/2016     Lab Results   Component Value Date    HDL 37 (L) 12/28/2019    HDL 39 (L) 12/15/2018    HDL 42 01/18/2018     Lab Results   Component Value Date    LDLCALC 143 (H) 12/28/2019    LDLCALC 126 (H) 12/15/2018    LDLCALC 122 (H) 01/18/2018     Lab Results   Component Value Date    TRIG 112 12/28/2019    TRIG 103 12/15/2018    TRIG 153 (H) 01/18/2018     No results found for: CHOLHDL   No results found for: INR, PROTIME     1   Agatston coronary artery calcium score greater than 400  -     NM myocardial perfusion spect (stress and/or rest); Future; Expected date: 09/10/2020    2  ASCVD (arteriosclerotic cardiovascular disease)  -     atorvastatin (LIPITOR) 40 mg tablet; Take 1 tablet (40 mg total) by mouth daily    3  Mixed hyperlipidemia  -     POCT ECG  -     NM myocardial perfusion spect (stress and/or rest); Future; Expected date: 09/10/2020  -     atorvastatin (LIPITOR) 40 mg tablet; Take 1 tablet (40 mg total) by mouth daily    4  Dyspnea on exertion  -     Echo complete with contrast if indicated; Future; Expected date: 09/10/2020  -     NM myocardial perfusion spect (stress and/or rest); Future; Expected date: 09/10/2020    5  Tobacco use    6  Tobacco abuse counseling        IMPRESSION:   CT coronary calcium score of 648 with increased calcification of LAD, July 2020   Dyspnea on exertion   Dyslipidemia w/ 143 mg/dL, HDL 37 mg/dL, December 2019   ASCVD 9 5%, September 2020   GERD   Tobacco use   History of alcohol abuse    PLAN:  It was a pleasure to see Dennis Nixon in the office today for initial CV evaluation  He is here today to establish care after his recent coronary calcium score  The coronary calcium score was elevated at 648  He however remains without chest discomfort  He does admit to some exertional shortness of breath  He examines to be euvolemic without signs of heart failure  ECG was nonischemic in the office today  His blood pressure and heart rate are well controlled  His LDL has been elevated at 143 mg/dL on lab work from December 2019  Based on his clinical presentation, I have the following recommendations:    1  Given his abnormal coronary artery calcium score greater than 400 and dyspnea on exertion, I have recommended that he undergo an exercise nuclear stress test to assess for any evidence of underlying myocardial ischemia  2  With his dyspnea on exertion, we will obtain a 2D echocardiogram to assess his cardiac structure and function  3   As his CT demonstrates evidence of CAD and he has elevated ASCVD, I have recommended that he go on high-intensity statin therapy  Will initiate Lipitor 40 mg q h s   Risks and benefits of medication discussed  Goal LDL is less than 70 mg/dL due to his CAD  4   Unfortunately, the patient continues to smoke  I have counseled the patient on tobacco cessation for greater than 3 min  With abnormal CT calcium score, patient understands risk of further CAD should he continues smoke  5  As always, I have recommended a heart healthy diet low in sodium and exercise regimen  6  We will follow up with him after testing  As always, please do not hesitate to call with any questions  Portions of the record may have been created with voice recognition software  Occasional wrong word or "sound a like" substitutions may have occurred due to the inherent limitations of voice recognition software  Read the chart carefully and recognize, using context, where substitutions have occurred          Signed: Claudia Pino DO, [facility] [place]

## 2020-10-05 ENCOUNTER — HOSPITAL ENCOUNTER (OUTPATIENT)
Dept: NON INVASIVE DIAGNOSTICS | Facility: HOSPITAL | Age: 50
Discharge: HOME | End: 2020-10-05
Attending: INTERNAL MEDICINE
Payer: COMMERCIAL

## 2020-10-05 ENCOUNTER — HOSPITAL ENCOUNTER (OUTPATIENT)
Dept: NUCLEAR MEDICINE | Facility: HOSPITAL | Age: 50
Discharge: HOME | End: 2020-10-05
Attending: INTERNAL MEDICINE
Payer: COMMERCIAL

## 2020-10-05 DIAGNOSIS — E78.2 MIXED HYPERLIPIDEMIA: ICD-10-CM

## 2020-10-05 DIAGNOSIS — R93.1 AGATSTON CORONARY ARTERY CALCIUM SCORE GREATER THAN 400: ICD-10-CM

## 2020-10-05 DIAGNOSIS — R06.00 DYSPNEA ON EXERTION: ICD-10-CM

## 2020-10-05 PROCEDURE — 78452 HT MUSCLE IMAGE SPECT MULT: CPT | Performed by: INTERNAL MEDICINE

## 2020-10-05 PROCEDURE — 93016 CV STRESS TEST SUPVJ ONLY: CPT | Performed by: INTERNAL MEDICINE

## 2020-10-05 PROCEDURE — 93018 CV STRESS TEST I&R ONLY: CPT | Performed by: INTERNAL MEDICINE

## 2020-10-05 PROCEDURE — 93017 CV STRESS TEST TRACING ONLY: CPT

## 2020-10-05 PROCEDURE — G1004 CDSM NDSC: HCPCS

## 2020-10-05 PROCEDURE — 78452 HT MUSCLE IMAGE SPECT MULT: CPT

## 2020-10-05 PROCEDURE — A9502 TC99M TETROFOSMIN: HCPCS

## 2020-10-06 LAB
ARRHY DURING EX: NORMAL
CHEST PAIN STATEMENT: NORMAL
MAX DIASTOLIC BP: 70 MMHG
MAX HEART RATE: 162 BPM
MAX PREDICTED HEART RATE: 170 BPM
MAX. SYSTOLIC BP: 158 MMHG
PROTOCOL NAME: NORMAL
REASON FOR TERMINATION: NORMAL
TARGET HR FORMULA: NORMAL
TEST INDICATION: NORMAL
TIME IN EXERCISE PHASE: NORMAL

## 2020-11-14 DIAGNOSIS — K21.9 GASTROESOPHAGEAL REFLUX DISEASE: ICD-10-CM

## 2020-11-15 RX ORDER — OMEPRAZOLE 20 MG/1
CAPSULE, DELAYED RELEASE ORAL
Qty: 90 CAPSULE | Refills: 0 | Status: SHIPPED | OUTPATIENT
Start: 2020-11-15 | End: 2020-11-18 | Stop reason: SDUPTHER

## 2020-11-17 ENCOUNTER — HOSPITAL ENCOUNTER (OUTPATIENT)
Dept: NON INVASIVE DIAGNOSTICS | Facility: CLINIC | Age: 50
Discharge: HOME | End: 2020-11-17
Attending: INTERNAL MEDICINE
Payer: COMMERCIAL

## 2020-11-17 DIAGNOSIS — R06.00 DYSPNEA ON EXERTION: ICD-10-CM

## 2020-11-17 PROCEDURE — 93306 TTE W/DOPPLER COMPLETE: CPT

## 2020-11-17 PROCEDURE — 93306 TTE W/DOPPLER COMPLETE: CPT | Performed by: INTERNAL MEDICINE

## 2020-11-18 DIAGNOSIS — K21.9 GASTROESOPHAGEAL REFLUX DISEASE: ICD-10-CM

## 2020-11-18 DIAGNOSIS — F32.A DEPRESSION, UNSPECIFIED DEPRESSION TYPE: ICD-10-CM

## 2020-11-18 RX ORDER — OMEPRAZOLE 20 MG/1
20 CAPSULE, DELAYED RELEASE ORAL DAILY
Qty: 90 CAPSULE | Refills: 0 | Status: SHIPPED | OUTPATIENT
Start: 2020-11-18 | End: 2021-05-12 | Stop reason: SDUPTHER

## 2020-11-23 ENCOUNTER — OFFICE VISIT (OUTPATIENT)
Dept: CARDIOLOGY CLINIC | Facility: CLINIC | Age: 50
End: 2020-11-23
Payer: COMMERCIAL

## 2020-11-23 VITALS
TEMPERATURE: 98.1 F | OXYGEN SATURATION: 98 % | HEART RATE: 73 BPM | WEIGHT: 189.6 LBS | HEIGHT: 69 IN | DIASTOLIC BLOOD PRESSURE: 70 MMHG | SYSTOLIC BLOOD PRESSURE: 122 MMHG | BODY MASS INDEX: 28.08 KG/M2

## 2020-11-23 DIAGNOSIS — E78.2 MIXED HYPERLIPIDEMIA: ICD-10-CM

## 2020-11-23 DIAGNOSIS — R93.1 AGATSTON CORONARY ARTERY CALCIUM SCORE GREATER THAN 400: ICD-10-CM

## 2020-11-23 DIAGNOSIS — R06.00 DYSPNEA ON EXERTION: Primary | ICD-10-CM

## 2020-11-23 DIAGNOSIS — I25.10 ASCVD (ARTERIOSCLEROTIC CARDIOVASCULAR DISEASE): ICD-10-CM

## 2020-11-23 PROCEDURE — 4004F PT TOBACCO SCREEN RCVD TLK: CPT | Performed by: INTERNAL MEDICINE

## 2020-11-23 PROCEDURE — 3008F BODY MASS INDEX DOCD: CPT | Performed by: INTERNAL MEDICINE

## 2020-11-23 PROCEDURE — 99214 OFFICE O/P EST MOD 30 MIN: CPT | Performed by: INTERNAL MEDICINE

## 2021-03-10 DIAGNOSIS — Z23 ENCOUNTER FOR IMMUNIZATION: ICD-10-CM

## 2021-03-27 ENCOUNTER — IMMUNIZATIONS (OUTPATIENT)
Dept: FAMILY MEDICINE CLINIC | Facility: HOSPITAL | Age: 51
End: 2021-03-27

## 2021-03-27 DIAGNOSIS — Z23 ENCOUNTER FOR IMMUNIZATION: Primary | ICD-10-CM

## 2021-03-27 PROCEDURE — 0001A SARS-COV-2 / COVID-19 MRNA VACCINE (PFIZER-BIONTECH) 30 MCG: CPT

## 2021-03-27 PROCEDURE — 91300 SARS-COV-2 / COVID-19 MRNA VACCINE (PFIZER-BIONTECH) 30 MCG: CPT

## 2021-04-17 ENCOUNTER — IMMUNIZATIONS (OUTPATIENT)
Dept: FAMILY MEDICINE CLINIC | Facility: HOSPITAL | Age: 51
End: 2021-04-17

## 2021-04-17 DIAGNOSIS — Z23 ENCOUNTER FOR IMMUNIZATION: Primary | ICD-10-CM

## 2021-04-17 PROCEDURE — 91300 SARS-COV-2 / COVID-19 MRNA VACCINE (PFIZER-BIONTECH) 30 MCG: CPT

## 2021-04-17 PROCEDURE — 0002A SARS-COV-2 / COVID-19 MRNA VACCINE (PFIZER-BIONTECH) 30 MCG: CPT

## 2021-05-10 ENCOUNTER — TELEPHONE (OUTPATIENT)
Dept: FAMILY MEDICINE CLINIC | Facility: CLINIC | Age: 51
End: 2021-05-10

## 2021-05-10 DIAGNOSIS — K21.9 GASTROESOPHAGEAL REFLUX DISEASE: ICD-10-CM

## 2021-05-10 DIAGNOSIS — F32.A DEPRESSION, UNSPECIFIED DEPRESSION TYPE: ICD-10-CM

## 2021-05-10 NOTE — TELEPHONE ENCOUNTER
Called pt, we receieved refill request  Pt not seen since 2019  Pt scheduled yearly physical on 6/28/21  No evening appt's sooner  Can we refill pt's RX until he is seen on this day  Please advise

## 2021-05-11 NOTE — TELEPHONE ENCOUNTER
I offered patient another doctor to be seen sooner and he declined  Can we give him a short supply to hold him until his appointment? [facility] in [place]

## 2021-05-12 DIAGNOSIS — F32.A DEPRESSION, UNSPECIFIED DEPRESSION TYPE: ICD-10-CM

## 2021-05-12 DIAGNOSIS — K21.9 GASTROESOPHAGEAL REFLUX DISEASE: ICD-10-CM

## 2021-05-12 RX ORDER — OMEPRAZOLE 20 MG/1
20 CAPSULE, DELAYED RELEASE ORAL DAILY
Qty: 30 CAPSULE | Refills: 1 | Status: SHIPPED | OUTPATIENT
Start: 2021-05-12 | End: 2021-06-28 | Stop reason: SDUPTHER

## 2021-05-30 DIAGNOSIS — I25.10 ASCVD (ARTERIOSCLEROTIC CARDIOVASCULAR DISEASE): ICD-10-CM

## 2021-05-30 DIAGNOSIS — E78.2 MIXED HYPERLIPIDEMIA: ICD-10-CM

## 2021-06-01 RX ORDER — ATORVASTATIN CALCIUM 40 MG/1
TABLET, FILM COATED ORAL
Qty: 90 TABLET | Refills: 2 | Status: SHIPPED | OUTPATIENT
Start: 2021-06-01 | End: 2021-06-28 | Stop reason: SDUPTHER

## 2021-06-05 DIAGNOSIS — R73.9 ELEVATED BLOOD SUGAR: ICD-10-CM

## 2021-06-05 DIAGNOSIS — Z13.0 SCREENING FOR DEFICIENCY ANEMIA: ICD-10-CM

## 2021-06-05 DIAGNOSIS — E78.2 MIXED HYPERLIPIDEMIA: Primary | ICD-10-CM

## 2021-06-05 DIAGNOSIS — Z12.5 SCREENING FOR PROSTATE CANCER: ICD-10-CM

## 2021-06-05 DIAGNOSIS — Z13.29 SCREENING FOR THYROID DISORDER: ICD-10-CM

## 2021-06-18 ENCOUNTER — APPOINTMENT (OUTPATIENT)
Dept: LAB | Facility: MEDICAL CENTER | Age: 51
End: 2021-06-18
Payer: COMMERCIAL

## 2021-06-18 DIAGNOSIS — Z13.0 SCREENING FOR DEFICIENCY ANEMIA: ICD-10-CM

## 2021-06-18 DIAGNOSIS — Z12.5 SCREENING FOR PROSTATE CANCER: ICD-10-CM

## 2021-06-18 DIAGNOSIS — Z13.29 SCREENING FOR THYROID DISORDER: ICD-10-CM

## 2021-06-18 DIAGNOSIS — R73.9 ELEVATED BLOOD SUGAR: ICD-10-CM

## 2021-06-18 DIAGNOSIS — E78.2 MIXED HYPERLIPIDEMIA: ICD-10-CM

## 2021-06-18 LAB
ALBUMIN SERPL BCP-MCNC: 4.2 G/DL (ref 3.5–5)
ALP SERPL-CCNC: 60 U/L (ref 46–116)
ALT SERPL W P-5'-P-CCNC: 26 U/L (ref 12–78)
ANION GAP SERPL CALCULATED.3IONS-SCNC: 4 MMOL/L (ref 4–13)
AST SERPL W P-5'-P-CCNC: 24 U/L (ref 5–45)
BASOPHILS # BLD AUTO: 0.05 THOUSANDS/ΜL (ref 0–0.1)
BASOPHILS NFR BLD AUTO: 1 % (ref 0–1)
BILIRUB SERPL-MCNC: 0.94 MG/DL (ref 0.2–1)
BUN SERPL-MCNC: 19 MG/DL (ref 5–25)
CALCIUM SERPL-MCNC: 9.7 MG/DL (ref 8.3–10.1)
CHLORIDE SERPL-SCNC: 104 MMOL/L (ref 100–108)
CHOLEST SERPL-MCNC: 112 MG/DL (ref 50–200)
CO2 SERPL-SCNC: 30 MMOL/L (ref 21–32)
CREAT SERPL-MCNC: 0.85 MG/DL (ref 0.6–1.3)
EOSINOPHIL # BLD AUTO: 0.08 THOUSAND/ΜL (ref 0–0.61)
EOSINOPHIL NFR BLD AUTO: 1 % (ref 0–6)
ERYTHROCYTE [DISTWIDTH] IN BLOOD BY AUTOMATED COUNT: 13.2 % (ref 11.6–15.1)
EST. AVERAGE GLUCOSE BLD GHB EST-MCNC: 117 MG/DL
GFR SERPL CREATININE-BSD FRML MDRD: 101 ML/MIN/1.73SQ M
GLUCOSE P FAST SERPL-MCNC: 99 MG/DL (ref 65–99)
HBA1C MFR BLD: 5.7 %
HCT VFR BLD AUTO: 47 % (ref 36.5–49.3)
HDLC SERPL-MCNC: 37 MG/DL
HGB BLD-MCNC: 15.8 G/DL (ref 12–17)
IMM GRANULOCYTES # BLD AUTO: 0.02 THOUSAND/UL (ref 0–0.2)
IMM GRANULOCYTES NFR BLD AUTO: 0 % (ref 0–2)
LDLC SERPL CALC-MCNC: 62 MG/DL (ref 0–100)
LYMPHOCYTES # BLD AUTO: 1.36 THOUSANDS/ΜL (ref 0.6–4.47)
LYMPHOCYTES NFR BLD AUTO: 16 % (ref 14–44)
MCH RBC QN AUTO: 30.9 PG (ref 26.8–34.3)
MCHC RBC AUTO-ENTMCNC: 33.6 G/DL (ref 31.4–37.4)
MCV RBC AUTO: 92 FL (ref 82–98)
MONOCYTES # BLD AUTO: 0.81 THOUSAND/ΜL (ref 0.17–1.22)
MONOCYTES NFR BLD AUTO: 10 % (ref 4–12)
NEUTROPHILS # BLD AUTO: 6.25 THOUSANDS/ΜL (ref 1.85–7.62)
NEUTS SEG NFR BLD AUTO: 72 % (ref 43–75)
NRBC BLD AUTO-RTO: 0 /100 WBCS
PLATELET # BLD AUTO: 244 THOUSANDS/UL (ref 149–390)
PMV BLD AUTO: 10.2 FL (ref 8.9–12.7)
POTASSIUM SERPL-SCNC: 4.6 MMOL/L (ref 3.5–5.3)
PROT SERPL-MCNC: 7.4 G/DL (ref 6.4–8.2)
PSA SERPL-MCNC: 0.7 NG/ML (ref 0–4)
RBC # BLD AUTO: 5.12 MILLION/UL (ref 3.88–5.62)
SODIUM SERPL-SCNC: 138 MMOL/L (ref 136–145)
TRIGL SERPL-MCNC: 64 MG/DL
TSH SERPL DL<=0.05 MIU/L-ACNC: 1.14 UIU/ML (ref 0.36–3.74)
WBC # BLD AUTO: 8.57 THOUSAND/UL (ref 4.31–10.16)

## 2021-06-18 PROCEDURE — 84443 ASSAY THYROID STIM HORMONE: CPT

## 2021-06-18 PROCEDURE — G0103 PSA SCREENING: HCPCS

## 2021-06-18 PROCEDURE — 83036 HEMOGLOBIN GLYCOSYLATED A1C: CPT

## 2021-06-18 PROCEDURE — 80053 COMPREHEN METABOLIC PANEL: CPT

## 2021-06-18 PROCEDURE — 36415 COLL VENOUS BLD VENIPUNCTURE: CPT

## 2021-06-18 PROCEDURE — 85025 COMPLETE CBC W/AUTO DIFF WBC: CPT

## 2021-06-18 PROCEDURE — 80061 LIPID PANEL: CPT

## 2021-06-28 ENCOUNTER — OFFICE VISIT (OUTPATIENT)
Dept: FAMILY MEDICINE CLINIC | Facility: CLINIC | Age: 51
End: 2021-06-28
Payer: COMMERCIAL

## 2021-06-28 VITALS
TEMPERATURE: 98.8 F | WEIGHT: 179 LBS | RESPIRATION RATE: 16 BRPM | BODY MASS INDEX: 26.51 KG/M2 | DIASTOLIC BLOOD PRESSURE: 70 MMHG | HEIGHT: 69 IN | OXYGEN SATURATION: 95 % | SYSTOLIC BLOOD PRESSURE: 114 MMHG | HEART RATE: 93 BPM

## 2021-06-28 DIAGNOSIS — K21.00 GASTROESOPHAGEAL REFLUX DISEASE WITH ESOPHAGITIS, UNSPECIFIED WHETHER HEMORRHAGE: ICD-10-CM

## 2021-06-28 DIAGNOSIS — F32.A DEPRESSION, UNSPECIFIED DEPRESSION TYPE: ICD-10-CM

## 2021-06-28 DIAGNOSIS — R73.9 ELEVATED BLOOD SUGAR: ICD-10-CM

## 2021-06-28 DIAGNOSIS — N52.03 COMBINED ARTERIAL INSUFFICIENCY AND CORPORO-VENOUS OCCLUSIVE ERECTILE DYSFUNCTION: ICD-10-CM

## 2021-06-28 DIAGNOSIS — IMO0002 CHRONIC MIGRAINE: ICD-10-CM

## 2021-06-28 DIAGNOSIS — Z72.0 TOBACCO USE: ICD-10-CM

## 2021-06-28 DIAGNOSIS — F17.210 CIGARETTE NICOTINE DEPENDENCE WITHOUT COMPLICATION: ICD-10-CM

## 2021-06-28 DIAGNOSIS — K21.9 GASTROESOPHAGEAL REFLUX DISEASE: ICD-10-CM

## 2021-06-28 DIAGNOSIS — I25.10 ASCVD (ARTERIOSCLEROTIC CARDIOVASCULAR DISEASE): ICD-10-CM

## 2021-06-28 DIAGNOSIS — Z00.00 WELL ADULT EXAM: Primary | ICD-10-CM

## 2021-06-28 DIAGNOSIS — E78.2 MIXED HYPERLIPIDEMIA: ICD-10-CM

## 2021-06-28 DIAGNOSIS — R51.9 NONINTRACTABLE HEADACHE, UNSPECIFIED CHRONICITY PATTERN, UNSPECIFIED HEADACHE TYPE: ICD-10-CM

## 2021-06-28 PROBLEM — R53.83 FATIGUE: Status: RESOLVED | Noted: 2018-12-13 | Resolved: 2021-06-28

## 2021-06-28 PROCEDURE — 3725F SCREEN DEPRESSION PERFORMED: CPT | Performed by: FAMILY MEDICINE

## 2021-06-28 PROCEDURE — 99396 PREV VISIT EST AGE 40-64: CPT | Performed by: FAMILY MEDICINE

## 2021-06-28 PROCEDURE — 4004F PT TOBACCO SCREEN RCVD TLK: CPT | Performed by: FAMILY MEDICINE

## 2021-06-28 PROCEDURE — 3008F BODY MASS INDEX DOCD: CPT | Performed by: FAMILY MEDICINE

## 2021-06-28 RX ORDER — BUTALBITAL, ACETAMINOPHEN AND CAFFEINE 50; 325; 40 MG/1; MG/1; MG/1
1 TABLET ORAL EVERY 6 HOURS PRN
Qty: 30 TABLET | Refills: 0 | Status: CANCELLED | OUTPATIENT
Start: 2021-06-28

## 2021-06-28 RX ORDER — OMEPRAZOLE 20 MG/1
20 CAPSULE, DELAYED RELEASE ORAL DAILY
Qty: 90 CAPSULE | Refills: 1 | Status: SHIPPED | OUTPATIENT
Start: 2021-06-28 | End: 2021-12-29 | Stop reason: SDUPTHER

## 2021-06-28 RX ORDER — ATORVASTATIN CALCIUM 40 MG/1
40 TABLET, FILM COATED ORAL DAILY
Qty: 90 TABLET | Refills: 1 | Status: SHIPPED | OUTPATIENT
Start: 2021-06-28 | End: 2022-03-04 | Stop reason: SDUPTHER

## 2021-06-28 NOTE — ASSESSMENT & PLAN NOTE
Cholesterol from June 18th 112, LDL 62, HDL 37  Much improved since starting atorvastatin 40 mg daily    Continue follow-up with his cardiologist

## 2021-06-28 NOTE — ASSESSMENT & PLAN NOTE
Blood sugar from 6/18  Was 99  A1c 5 7%  Continue reduced carb diet exercise    Will continue to monitor yearly

## 2021-06-28 NOTE — ASSESSMENT & PLAN NOTE
Patient presents for 69-year-old yearly physical examination today  Overall he feels well  He is trying to watch his diet  He is doing well on current medications including omeprazole for GERD, sertraline for depression, and occasional use of sildenafil for ED  Patient still smoking approximately 1 pack per day  His exam is essentially unremarkable today  I reviewed his blood work from June 18th ,   Mildly elevated blood sugar, otherwise unremarkable  Patient current with colon cancer screening  Continue lifestyle modification    Recheck 1 year

## 2021-06-28 NOTE — PROGRESS NOTES
[facility]      NAME: Nimo Casas  AGE: 45 y o  SEX: male  : 1970   MRN: [id number]    DATE: 2021  TIME: 5:32 PM    Assessment and Plan     Problem List Items Addressed This Visit     Well adult exam - Primary     Patient presents for 50-year-old yearly physical examination today  Overall he feels well  He is trying to watch his diet  He is doing well on current medications including omeprazole for GERD, sertraline for depression, and occasional use of sildenafil for ED  Patient still smoking approximately 1 pack per day  His exam is essentially unremarkable today  I reviewed his blood work from  ,   Mildly elevated blood sugar, otherwise unremarkable  Patient current with colon cancer screening  Continue lifestyle modification  Recheck 1 year           Esophagitis, reflux      Doing well on omeprazole 20 mg daily without breakthrough symptoms         Relevant Medications    omeprazole (PriLOSEC) 20 mg delayed release capsule    Depression      Doing well on sertraline 75 mg daily         Relevant Medications    sertraline (ZOLOFT) 50 mg tablet    Mixed hyperlipidemia      Cholesterol from  112, LDL 62, HDL 37  Much improved since starting atorvastatin 40 mg daily  Continue follow-up with his cardiologist         Relevant Medications    atorvastatin (LIPITOR) 40 mg tablet    Tobacco use      Patient still smoking approximately 1 pack per day   today  Rx given for low radiation CT chest         Chronic migraine      Doing well with occasional use of butalbital acetaminophen         Relevant Medications    sertraline (ZOLOFT) 50 mg tablet    Erectile dysfunction      Doing well with occasional use of sildenafil         Elevated blood sugar      Blood sugar from   Was 99  A1c 5 7%  Continue reduced carb diet exercise    Will continue to monitor yearly           Other Visit Diagnoses     ASCVD (arteriosclerotic cardiovascular disease) Relevant Medications    atorvastatin (LIPITOR) 40 mg tablet    Nonintractable headache, unspecified chronicity pattern, unspecified headache type        Gastroesophageal reflux disease        Relevant Medications    omeprazole (PriLOSEC) 20 mg delayed release capsule    Cigarette nicotine dependence without complication        Relevant Orders    CT lung screening program         patient had COVID vaccination   current with colon cancer screening     yearly lab results from June 18th reviewed with patient     offered patient Leeroy  He states he will consider next year  Return to office in: yearly/PRN    Chief Complaint     Chief Complaint   Patient presents with    Physical Exam       History of Present Illness     Patient presents for 58-year-old yearly physical examination today  Overall he feels well  He is trying to watch his diet  He is doing well on current medications including omeprazole for GERD, sertraline for depression, and occasional use of sildenafil for ED  Patient still smoking approximately 1 pack per day  He had labs done on June 18th      The following portions of the patient's history were reviewed and updated as appropriate: allergies, current medications, past family history, past medical history, past social history, past surgical history and problem list     Review of Systems   Review of Systems   Respiratory: Negative  Cardiovascular: Negative  Gastrointestinal: Negative  Genitourinary: Negative          Active Problem List     Patient Active Problem List   Diagnosis    Well adult exam    Esophagitis, reflux    Depression    Mixed hyperlipidemia    Tobacco use    Benign prostatic hyperplasia without lower urinary tract symptoms    Chronic migraine    Arthralgia    Erectile dysfunction    Elevated blood sugar       Objective   /70 (BP Location: Left arm, Patient Position: Sitting, Cuff Size: Adult)   Pulse 93   Temp 98 8 °F (37 1 °C) (Tympanic)   Resp 16 Ht 5' 8 9" (1 75 m)   Wt 81 2 kg (179 lb)   SpO2 95%   BMI 26 51 kg/m²     Physical Exam  Vitals and nursing note reviewed  Constitutional:       Appearance: He is well-developed  HENT:      Head: Normocephalic and atraumatic  Right Ear: External ear normal       Left Ear: External ear normal    Eyes:      Pupils: Pupils are equal, round, and reactive to light  Cardiovascular:      Rate and Rhythm: Normal rate and regular rhythm  Heart sounds: Normal heart sounds  Pulmonary:      Effort: Pulmonary effort is normal       Breath sounds: Normal breath sounds  Abdominal:      General: Bowel sounds are normal       Palpations: Abdomen is soft  Musculoskeletal:      Cervical back: Normal range of motion and neck supple  Neurological:      Mental Status: He is alert and oriented to person, place, and time  Deep Tendon Reflexes: Reflexes are normal and symmetric  Psychiatric:         Behavior: Behavior normal          Thought Content:  Thought content normal          Judgment: Judgment normal          Pertinent Laboratory/Diagnostic Studies:  labs 6/18    Current Medications     Current Outpatient Medications:     atorvastatin (LIPITOR) 40 mg tablet, Take 1 tablet (40 mg total) by mouth daily, Disp: 90 tablet, Rfl: 1    butalbital-acetaminophen-caffeine (FIORICET,ESGIC) -40 mg per tablet, Take 1 tablet by mouth every 6 (six) hours as needed for headaches, Disp: 30 tablet, Rfl: 0    omeprazole (PriLOSEC) 20 mg delayed release capsule, Take 1 capsule (20 mg total) by mouth daily, Disp: 90 capsule, Rfl: 1    sertraline (ZOLOFT) 50 mg tablet, take 1 & 1/2 tablets by mouth daily, Disp: 45 tablet, Rfl: 1    methylPREDNISolone acetate (DEPO-MEDROL) 40 mg/mL injection, methylprednisolone acetate 40 mg/mL suspension for injection  in conjunction with Lidocaine (Patient not taking: Reported on 6/28/2021), Disp: , Rfl:     Health Maintenance     Health Maintenance   Topic Date Due    Hepatitis C Screening  Never done    Pneumococcal Vaccine: Pediatrics (0 to 5 Years) and At-Risk Patients (6 to 59 Years) (1 of 2 - PPSV23) Never done    Depression Remission PHQ  Never done    HIV Screening  Never done    BMI: Followup Plan  Never done    Colorectal Cancer Screening  Never done    Influenza Vaccine (Season Ended) 09/01/2021    BMI: Adult  06/28/2022    Annual Physical  06/28/2022    DTaP,Tdap,and Td Vaccines (2 - Td or Tdap) 03/29/2026    COVID-19 Vaccine  Completed    HIB Vaccine  Aged Out    Hepatitis B Vaccine  Aged Out    IPV Vaccine  Aged Out    Hepatitis A Vaccine  Aged Out    Meningococcal ACWY Vaccine  Aged Out    HPV Vaccine  Aged Out     Immunization History   Administered Date(s) Administered    SARS-CoV-2 / COVID-19 mRNA IM (Pfizer-BioNTech) 03/27/2021, 04/17/2021    Tdap 03/29/2016       Norris Barnett DO  \[facility]\"" [facility]

## 2021-08-19 ENCOUNTER — TELEPHONE (OUTPATIENT)
Dept: FAMILY MEDICINE CLINIC | Facility: CLINIC | Age: 51
End: 2021-08-19

## 2021-08-19 DIAGNOSIS — F32.A DEPRESSION, UNSPECIFIED DEPRESSION TYPE: ICD-10-CM

## 2021-11-12 DIAGNOSIS — F32.A DEPRESSION, UNSPECIFIED DEPRESSION TYPE: ICD-10-CM

## 2021-12-29 DIAGNOSIS — K21.9 GASTROESOPHAGEAL REFLUX DISEASE: ICD-10-CM

## 2021-12-29 RX ORDER — OMEPRAZOLE 20 MG/1
20 CAPSULE, DELAYED RELEASE ORAL DAILY
Qty: 90 CAPSULE | Refills: 1 | Status: SHIPPED | OUTPATIENT
Start: 2021-12-29 | End: 2022-06-20 | Stop reason: SDUPTHER

## 2022-01-13 DIAGNOSIS — F32.A DEPRESSION, UNSPECIFIED DEPRESSION TYPE: ICD-10-CM

## 2022-03-04 ENCOUNTER — OFFICE VISIT (OUTPATIENT)
Dept: CARDIOLOGY CLINIC | Facility: CLINIC | Age: 52
End: 2022-03-04
Payer: COMMERCIAL

## 2022-03-04 VITALS
DIASTOLIC BLOOD PRESSURE: 80 MMHG | HEART RATE: 72 BPM | SYSTOLIC BLOOD PRESSURE: 115 MMHG | WEIGHT: 181.6 LBS | BODY MASS INDEX: 26.9 KG/M2

## 2022-03-04 DIAGNOSIS — R93.1 AGATSTON CORONARY ARTERY CALCIUM SCORE GREATER THAN 400: Primary | ICD-10-CM

## 2022-03-04 DIAGNOSIS — E78.2 MIXED HYPERLIPIDEMIA: ICD-10-CM

## 2022-03-04 DIAGNOSIS — I25.10 ASCVD (ARTERIOSCLEROTIC CARDIOVASCULAR DISEASE): ICD-10-CM

## 2022-03-04 DIAGNOSIS — R06.00 DYSPNEA ON EXERTION: ICD-10-CM

## 2022-03-04 PROCEDURE — 93000 ELECTROCARDIOGRAM COMPLETE: CPT | Performed by: INTERNAL MEDICINE

## 2022-03-04 PROCEDURE — 99214 OFFICE O/P EST MOD 30 MIN: CPT | Performed by: INTERNAL MEDICINE

## 2022-03-04 PROCEDURE — 4004F PT TOBACCO SCREEN RCVD TLK: CPT | Performed by: INTERNAL MEDICINE

## 2022-03-04 RX ORDER — ATORVASTATIN CALCIUM 40 MG/1
40 TABLET, FILM COATED ORAL DAILY
Qty: 90 TABLET | Refills: 3 | Status: SHIPPED | OUTPATIENT
Start: 2022-03-04

## 2022-03-04 NOTE — PROGRESS NOTES
Cardiology Office Note  MD Nichol Mosher MD Dane Fresh, DO, Remigio Rector Mansoor, MD Minna Katayama, DO, Jay Giles DO, [facility] [place]  ----------------------------------------------------------------  [address]  [address]  [place], [address]    Liyah Camp 45 y o  male MRN: [id number]  Unit/Bed#:  Encounter: [id number]      History of Present Illness: It was a pleasure to see Liyah Camp in the office today for follow-up CV evaluation  He has a past medical history of coronary calcium score of 648 with increased calcifications of the LAD, dyslipidemia, GERD and tobacco abuse  He has a known history of alcohol use and quit 13 years ago  He is known to our practice in September 2020 following his screening CT coronary calcium score  The CT coronary calcium score was performed at [facility] demonstrating a score of 648  Following these results, he presented to our office for follow-up  Overall, he had been doing well aside from some mild dyspnea on exertion  He believe this might have been related to deconditioning, but is slightly increased over the course of several years  At our last encounter, we ordered testing including an echocardiogram and stress test performed in October and November 2020  Stress test was found to be negative for myocardial ischemia  Echocardiogram demonstrated normal left ventricular function without significant valvular disease  He denies any chest pain, pressure, tightness or squeezing  Denies lightheadedness, dizziness or palpitations  Denies lower extremity swelling, orthopnea or paroxysmal nocturnal dyspnea  He continues to approach full cessation of tobacco, but he is down to several cigarettes a day  This is the close to sees ever been  Overall, he feels in his usual state of health        Review of Systems:  Review of Systems   Constitutional: Negative for decreased appetite, fever, weight gain and weight loss  HENT: Negative for congestion and sore throat  Eyes: Negative for visual disturbance  Cardiovascular: Negative for chest pain, dyspnea on exertion, leg swelling, near-syncope and palpitations  Respiratory: Negative for cough and shortness of breath  Hematologic/Lymphatic: Negative for bleeding problem  Skin: Negative for rash  Musculoskeletal: Negative for myalgias and neck pain  Gastrointestinal: Negative for abdominal pain and nausea  Neurological: Negative for light-headedness and weakness  Psychiatric/Behavioral: Negative for depression         Past Medical History:   Diagnosis Date    Alcoholism ([facility] )     Cellulitis of buccal space of mouth     Last assessed: 4/17/2017    Dog bite of multiple sites     Last assessed: 4/6/2016    Eczema     Last assessed: 12/7/2012    Epididymitis     Last assessed: 9/18/2016    Hypertension     Last assessed: 7/7/2015    Tinea corporis     Last assessed: 11/15/2013       Past Surgical History:   Procedure Laterality Date    APPENDECTOMY      ELBOW SURGERY      HAND SURGERY      KNEE SURGERY      SHOULDER SURGERY         Social History     Socioeconomic History    Marital status: /Civil Union     Spouse name: None    Number of children: None    Years of education: None    Highest education level: None   Occupational History    None   Tobacco Use    Smoking status: Current Every Day Smoker    Smokeless tobacco: Never Used   Substance and Sexual Activity    Alcohol use: No     Comment: Stopped drinking alcohol    Drug use: None    Sexual activity: None   Other Topics Concern    None   Social History Narrative    None     Social Determinants of Health     Financial Resource Strain: Not on file   Food Insecurity: Not on file   Transportation Needs: Not on file   Physical Activity: Not on file   Stress: Not on file   Social Connections: Not on file   Intimate Partner Violence: Not on file   Housing Stability: Not on file       Family History   Problem Relation Age of Onset    Other Mother         Solitary kidney    Prostate cancer Father     Substance Abuse Neg Hx        Allergies   Allergen Reactions    Aspirin Vomiting    Erythromycin Rash    Penicillins Rash         Current Outpatient Medications:     atorvastatin (LIPITOR) 40 mg tablet, Take 1 tablet (40 mg total) by mouth daily, Disp: 90 tablet, Rfl: 3    butalbital-acetaminophen-caffeine (FIORICET,ESGIC) -40 mg per tablet, Take 1 tablet by mouth every 6 (six) hours as needed for headaches, Disp: 30 tablet, Rfl: 0    omeprazole (PriLOSEC) 20 mg delayed release capsule, Take 1 capsule (20 mg total) by mouth daily, Disp: 90 capsule, Rfl: 1    sertraline (ZOLOFT) 50 mg tablet, take 1 & 1/2 tablets by mouth daily, Disp: 45 tablet, Rfl: 5    methylPREDNISolone acetate (DEPO-MEDROL) 40 mg/mL injection, methylprednisolone acetate 40 mg/mL suspension for injection  in conjunction with Lidocaine (Patient not taking: Reported on 6/28/2021), Disp: , Rfl:     Vitals:    03/04/22 1624   BP: 115/80   BP Location: Right arm   Patient Position: Sitting   Cuff Size: Large   Pulse: 72   Weight: 82 4 kg (181 lb 9 6 oz)       PHYSICAL EXAMINATION:  Gen: Awake, Alert, NAD  Head/eyes: AT/NC, pupils equal and round, Anicteric  ENT: mmm  Neck: Supple, No elevated JVP, trachea midline  Resp: CTA bilaterally no w/r/r  CV: RRR +S1, S2, No m/r/g  Abd: Soft, NT/ND + BS  Ext: no LE edema bilaterally  Neuro:  Follows commands, moves all extermities  Psych: Appropriate affect, normal mood, pleasant attitude, non-combative  Skin: warm; no rash, erythema or venous stasis changes on exposed skin    --------------------------------------------------------------------------------  TREADMILL STRESS  No results found for this or any previous visit    --------------------------------------------------------------------------------  NUCLEAR STRESS TEST: No results found for this or any previous visit  No results found for this or any previous visit     --------------------------------------------------------------------------------  CATH:  No results found for this or any previous visit   --------------------------------------------------------------------------------  ECHO:   No results found for this or any previous visit  No results found for this or any previous visit   --------------------------------------------------------------------------------  HOLTER  No results found for this or any previous visit  No results found for this or any previous visit   --------------------------------------------------------------------------------  CAROTIDS  No results found for this or any previous visit    --------------------------------------------------------------------------------  ECGs:  Results for orders placed or performed in visit on 03/04/22   POCT ECG    Impression    Sinus rhythm 72 bpm, normal ECG        Lab Results   Component Value Date    WBC 8 57 06/18/2021    HGB 15 8 06/18/2021    HCT 47 0 06/18/2021    MCV 92 06/18/2021     06/18/2021      Lab Results   Component Value Date    SODIUM 138 06/18/2021    K 4 6 06/18/2021     06/18/2021    CO2 30 06/18/2021    BUN 19 06/18/2021    CREATININE 0 85 06/18/2021    CALCIUM 9 7 06/18/2021      Lab Results   Component Value Date    HGBA1C 5 7 (H) 06/18/2021      Lab Results   Component Value Date    CHOL 186 03/26/2016     Lab Results   Component Value Date    HDL 37 (L) 06/18/2021    HDL 37 (L) 12/28/2019    HDL 39 (L) 12/15/2018     Lab Results   Component Value Date    LDLCALC 62 06/18/2021    LDLCALC 143 (H) 12/28/2019    LDLCALC 126 (H) 12/15/2018     Lab Results   Component Value Date    TRIG 64 06/18/2021    TRIG 112 12/28/2019    TRIG 103 12/15/2018     No results found for: CHOLHDL   No results found for: INR, PROTIME     1  Agatston coronary artery calcium score greater than 400    2  Dyspnea on exertion  -     POCT ECG    3  Mixed hyperlipidemia  -     atorvastatin (LIPITOR) 40 mg tablet; Take 1 tablet (40 mg total) by mouth daily    4  ASCVD (arteriosclerotic cardiovascular disease)  -     atorvastatin (LIPITOR) 40 mg tablet; Take 1 tablet (40 mg total) by mouth daily        IMPRESSION:   CT coronary calcium score of 648 with increased calcification of LAD, July 2020   Dyspnea on exertion   Dyslipidemia w/ 62 mg/dL, HDL 37 mg/dL, June 2021   ASCVD 9 7%, November 2020   Exercise nuclear stress test negative for myocardial ischemia, ET 12 mins, 95% MPHR, 13 4 METs, gated EF 60%, October 2020   LVEF 60-65%, trace TR, November 2020   GERD   Tobacco use   History of alcohol abuse    PLAN:  It was a pleasure to see Bridgett in the office today for follow-up CV evaluation  He is here today for routine CV follow-up  Since our last encounter, he feels well overall  He has substantially decreased his tobacco use and is now down to several cigarettes a day  His LDL cholesterol came back at 62 mg/dL in June 2021  The patient has been compliant with his medications and is feeling well overall  He has no symptoms concerning for angina and no signs or symptoms of heart failure  He examines to be euvolemic in the office today  Blood pressure and heart rate are currently stable  He can perform greater than 4 Mets on a daily basis without exertional symptoms  ECG is nonischemic showing normal electrocardiogram   Based on his clinical presentation, I have the following recommendations:    1  Recommend aggressive risk factor and lifestyle modifications  2  Would encourage 30 minutes a day, 5 days a week of moderate intensity activity to build cardiovascular endurance  3  Would encourage heart healthy diet low in sodium and carbohydrate  Diet such as the Mediterranean diet help with lipid lowering  4  Continue statin therapy  Goal LDL is less than 70 mg/dL  Patient is to goal   5  No changes to his medications at this time    He is up-to-date on his CV testing  6  Tobacco cessation has been advised  7  We will follow up with him in 1 year  As always, please do not hesitate to call with any questions  Portions of the record may have been created with voice recognition software  Occasional wrong word or "sound a like" substitutions may have occurred due to the inherent limitations of voice recognition software  Read the chart carefully and recognize, using context, where substitutions have occurred      Signed: Keny Grider DO, Connee Levine

## 2022-05-10 ENCOUNTER — OFFICE VISIT (OUTPATIENT)
Dept: FAMILY MEDICINE CLINIC | Facility: CLINIC | Age: 52
End: 2022-05-10
Payer: COMMERCIAL

## 2022-05-10 VITALS
DIASTOLIC BLOOD PRESSURE: 88 MMHG | TEMPERATURE: 101.4 F | WEIGHT: 180.6 LBS | HEART RATE: 76 BPM | OXYGEN SATURATION: 99 % | HEIGHT: 68 IN | SYSTOLIC BLOOD PRESSURE: 126 MMHG | BODY MASS INDEX: 27.37 KG/M2

## 2022-05-10 DIAGNOSIS — N41.9 PROSTATITIS, UNSPECIFIED PROSTATITIS TYPE: Primary | ICD-10-CM

## 2022-05-10 PROCEDURE — 99213 OFFICE O/P EST LOW 20 MIN: CPT | Performed by: FAMILY MEDICINE

## 2022-05-10 PROCEDURE — 3008F BODY MASS INDEX DOCD: CPT | Performed by: FAMILY MEDICINE

## 2022-05-10 PROCEDURE — 4004F PT TOBACCO SCREEN RCVD TLK: CPT | Performed by: FAMILY MEDICINE

## 2022-05-10 RX ORDER — DOXYCYCLINE HYCLATE 100 MG/1
100 CAPSULE ORAL EVERY 12 HOURS SCHEDULED
Qty: 28 CAPSULE | Refills: 0 | Status: SHIPPED | OUTPATIENT
Start: 2022-05-10 | End: 2022-05-24

## 2022-05-10 NOTE — PROGRESS NOTES
[facility]      NAME: Fawn Davis  AGE: 45 y o  SEX: male  : 1970   MRN: [id number]    DATE: 5/10/2022  TIME: 6:57 PM    Assessment and Plan     Problem List Items Addressed This Visit     None      Visit Diagnoses     Prostatitis, unspecified prostatitis type    -  Primary    Relevant Medications    doxycycline hyclate (VIBRAMYCIN) 100 mg capsule      Symptoms most consistent with acute prostatitis  Will treat with 14 day course of antibiotics  Return to office in:  P r n  Chief Complaint     Chief Complaint   Patient presents with    Urinary Frequency       History of Present Illness     Patient was seen with a chief complaint of urinary symptoms  He notes that he is having some pain and pressure with dribbling at the end of his urinary stream   He is not having gross hematuria or dysuria throughout his entire stream       The following portions of the patient's history were reviewed and updated as appropriate: allergies, current medications, past family history, past medical history, past social history, past surgical history and problem list     Review of Systems   Review of Systems   Constitutional: Negative  Respiratory: Negative  Cardiovascular: Negative  Gastrointestinal: Negative  Genitourinary: Positive for difficulty urinating and dysuria  Musculoskeletal: Negative  Psychiatric/Behavioral: Negative          Active Problem List     Patient Active Problem List   Diagnosis    Well adult exam    Esophagitis, reflux    Depression    Mixed hyperlipidemia    Tobacco use    Benign prostatic hyperplasia without lower urinary tract symptoms    Chronic migraine    Arthralgia    Erectile dysfunction    Elevated blood sugar       Objective   /88 (BP Location: Left arm, Patient Position: Sitting, Cuff Size: Standard)   Pulse 76   Temp (!) 101 4 °F (38 6 °C) (Tympanic)   Ht 5' 8" (1 727 m)   Wt 81 9 kg (180 lb 9 6 oz)   SpO2 99%   BMI 27 46 kg/m²     Physical Exam  Constitutional:       General: He is not in acute distress  Appearance: He is well-developed  HENT:      Head: Normocephalic and atraumatic  Eyes:      Pupils: Pupils are equal, round, and reactive to light  Pulmonary:      Effort: Pulmonary effort is normal  No respiratory distress  Breath sounds: Normal breath sounds  Musculoskeletal:      Cervical back: Normal range of motion  Skin:     Coloration: Skin is not pale  Neurological:      Mental Status: He is alert and oriented to person, place, and time  Psychiatric:         Behavior: Behavior normal          Thought Content:  Thought content normal          Judgment: Judgment normal            Current Medications     Current Outpatient Medications:     atorvastatin (LIPITOR) 40 mg tablet, Take 1 tablet (40 mg total) by mouth daily, Disp: 90 tablet, Rfl: 3    butalbital-acetaminophen-caffeine (FIORICET,ESGIC) -40 mg per tablet, Take 1 tablet by mouth every 6 (six) hours as needed for headaches, Disp: 30 tablet, Rfl: 0    omeprazole (PriLOSEC) 20 mg delayed release capsule, Take 1 capsule (20 mg total) by mouth daily, Disp: 90 capsule, Rfl: 1    sertraline (ZOLOFT) 50 mg tablet, take 1 & 1/2 tablets by mouth daily, Disp: 45 tablet, Rfl: 5    doxycycline hyclate (VIBRAMYCIN) 100 mg capsule, Take 1 capsule (100 mg total) by mouth every 12 (twelve) hours for 14 days, Disp: 28 capsule, Rfl: 0    methylPREDNISolone acetate (DEPO-MEDROL) 40 mg/mL injection, methylprednisolone acetate 40 mg/mL suspension for injection  in conjunction with Lidocaine (Patient not taking: Reported on 6/28/2021), Disp: , Rfl:     Health Maintenance     Health Maintenance   Topic Date Due    Hepatitis C Screening  Never done    Pneumococcal Vaccine: Pediatrics (0 to 5 Years) and At-Risk Patients (6 to 59 Years) (1 of 2 - PPSV23) Never done    HIV Screening  Never done    BMI: Followup Plan  Never done    Colorectal Cancer Screening Never done    COVID-19 Vaccine (3 - Booster for [employer] series) 09/17/2021    Annual Physical  06/28/2022    Influenza Vaccine (Season Ended) 09/01/2022    BMI: Adult  05/10/2023    DTaP,Tdap,and Td Vaccines (2 - Td or Tdap) 03/29/2026    HIB Vaccine  Aged Out    Hepatitis B Vaccine  Aged Out    IPV Vaccine  Aged Out    Hepatitis A Vaccine  Aged Out    Meningococcal ACWY Vaccine  Aged Out    HPV Vaccine  Aged Out     Immunization History   Administered Date(s) Administered    COVID-19 PFIZER VACCINE 0 3 ML IM 03/27/2021, 04/17/2021    Tdap 03/29/2016       Chrissy Wilkinson DO  [facility]

## 2022-06-20 DIAGNOSIS — K21.9 GASTROESOPHAGEAL REFLUX DISEASE: ICD-10-CM

## 2022-06-20 RX ORDER — OMEPRAZOLE 20 MG/1
20 CAPSULE, DELAYED RELEASE ORAL DAILY
Qty: 90 CAPSULE | Refills: 1 | Status: SHIPPED | OUTPATIENT
Start: 2022-06-20

## 2022-06-29 DIAGNOSIS — F32.A DEPRESSION, UNSPECIFIED DEPRESSION TYPE: ICD-10-CM

## 2022-09-14 ENCOUNTER — TELEPHONE (OUTPATIENT)
Dept: FAMILY MEDICINE CLINIC | Facility: CLINIC | Age: 52
End: 2022-09-14

## 2022-09-14 NOTE — TELEPHONE ENCOUNTER
----- Message from Srinivasa Ch MA sent at 9/14/2022  9:43 AM EDT -----  Regarding: FW: Covid  Please schedule Obdulia Eng for virtual  ----- Message -----  From: Yon Adams  Sent: 9/14/2022   8:15 AM EDT  To: [facility]  Subject: Covid                                            Morning Doctor  Nahun Baugh I tested positive for covid on a home test  I feel a little sick but real bad at all  But my wife, Obdulia Eng, is VERY sick  Is there anything medicine wise that I can get for her to try and help her feel better? Horrible cough  Entire body hurts and some difficulty breathing  I worry about her and wanted to do anything I can to make her better    Thank you very much  I ll wait to hear from you soon  Celso Vela

## 2022-12-12 DIAGNOSIS — K21.9 GASTROESOPHAGEAL REFLUX DISEASE: ICD-10-CM

## 2022-12-12 RX ORDER — OMEPRAZOLE 20 MG/1
20 CAPSULE, DELAYED RELEASE ORAL DAILY
Qty: 90 CAPSULE | Refills: 1 | Status: SHIPPED | OUTPATIENT
Start: 2022-12-12

## 2023-01-01 ENCOUNTER — PATIENT MESSAGE (OUTPATIENT)
Dept: FAMILY MEDICINE CLINIC | Facility: CLINIC | Age: 53
End: 2023-01-01

## 2023-01-01 DIAGNOSIS — F32.A DEPRESSION, UNSPECIFIED DEPRESSION TYPE: ICD-10-CM

## 2023-01-03 DIAGNOSIS — Z11.59 NEED FOR HEPATITIS C SCREENING TEST: ICD-10-CM

## 2023-01-03 DIAGNOSIS — E78.2 MIXED HYPERLIPIDEMIA: Primary | ICD-10-CM

## 2023-01-03 DIAGNOSIS — R73.9 ELEVATED BLOOD SUGAR: ICD-10-CM

## 2023-01-03 DIAGNOSIS — Z13.29 SCREENING FOR THYROID DISORDER: ICD-10-CM

## 2023-01-03 DIAGNOSIS — Z12.5 SCREENING FOR PROSTATE CANCER: ICD-10-CM

## 2023-01-03 DIAGNOSIS — Z13.0 SCREENING FOR DEFICIENCY ANEMIA: ICD-10-CM

## 2023-01-07 ENCOUNTER — APPOINTMENT (OUTPATIENT)
Dept: LAB | Facility: MEDICAL CENTER | Age: 53
End: 2023-01-07

## 2023-01-07 DIAGNOSIS — Z12.5 SCREENING FOR PROSTATE CANCER: ICD-10-CM

## 2023-01-07 DIAGNOSIS — R73.9 ELEVATED BLOOD SUGAR: ICD-10-CM

## 2023-01-07 DIAGNOSIS — E78.2 MIXED HYPERLIPIDEMIA: ICD-10-CM

## 2023-01-07 DIAGNOSIS — Z13.29 SCREENING FOR THYROID DISORDER: ICD-10-CM

## 2023-01-07 DIAGNOSIS — Z11.59 NEED FOR HEPATITIS C SCREENING TEST: ICD-10-CM

## 2023-01-07 DIAGNOSIS — Z13.0 SCREENING FOR DEFICIENCY ANEMIA: ICD-10-CM

## 2023-01-07 LAB
ALBUMIN SERPL BCP-MCNC: 3.9 G/DL (ref 3.5–5)
ALP SERPL-CCNC: 60 U/L (ref 46–116)
ALT SERPL W P-5'-P-CCNC: 33 U/L (ref 12–78)
ANION GAP SERPL CALCULATED.3IONS-SCNC: 6 MMOL/L (ref 4–13)
AST SERPL W P-5'-P-CCNC: 19 U/L (ref 5–45)
BASOPHILS # BLD AUTO: 0.08 THOUSANDS/ÂΜL (ref 0–0.1)
BASOPHILS NFR BLD AUTO: 1 % (ref 0–1)
BILIRUB SERPL-MCNC: 0.71 MG/DL (ref 0.2–1)
BUN SERPL-MCNC: 14 MG/DL (ref 5–25)
CALCIUM SERPL-MCNC: 8.8 MG/DL (ref 8.3–10.1)
CHLORIDE SERPL-SCNC: 106 MMOL/L (ref 96–108)
CHOLEST SERPL-MCNC: 118 MG/DL
CO2 SERPL-SCNC: 25 MMOL/L (ref 21–32)
CREAT SERPL-MCNC: 0.85 MG/DL (ref 0.6–1.3)
EOSINOPHIL # BLD AUTO: 0.17 THOUSAND/ÂΜL (ref 0–0.61)
EOSINOPHIL NFR BLD AUTO: 2 % (ref 0–6)
ERYTHROCYTE [DISTWIDTH] IN BLOOD BY AUTOMATED COUNT: 12.7 % (ref 11.6–15.1)
GFR SERPL CREATININE-BSD FRML MDRD: 99 ML/MIN/1.73SQ M
GLUCOSE P FAST SERPL-MCNC: 100 MG/DL (ref 65–99)
HCT VFR BLD AUTO: 42 % (ref 36.5–49.3)
HCV AB SER QL: NORMAL
HDLC SERPL-MCNC: 35 MG/DL
HGB BLD-MCNC: 13.9 G/DL (ref 12–17)
IMM GRANULOCYTES # BLD AUTO: 0.03 THOUSAND/UL (ref 0–0.2)
IMM GRANULOCYTES NFR BLD AUTO: 0 % (ref 0–2)
LDLC SERPL CALC-MCNC: 67 MG/DL (ref 0–100)
LYMPHOCYTES # BLD AUTO: 2.21 THOUSANDS/ÂΜL (ref 0.6–4.47)
LYMPHOCYTES NFR BLD AUTO: 24 % (ref 14–44)
MCH RBC QN AUTO: 30.2 PG (ref 26.8–34.3)
MCHC RBC AUTO-ENTMCNC: 33.1 G/DL (ref 31.4–37.4)
MCV RBC AUTO: 91 FL (ref 82–98)
MONOCYTES # BLD AUTO: 0.61 THOUSAND/ÂΜL (ref 0.17–1.22)
MONOCYTES NFR BLD AUTO: 7 % (ref 4–12)
NEUTROPHILS # BLD AUTO: 6.12 THOUSANDS/ÂΜL (ref 1.85–7.62)
NEUTS SEG NFR BLD AUTO: 66 % (ref 43–75)
NRBC BLD AUTO-RTO: 0 /100 WBCS
PLATELET # BLD AUTO: 314 THOUSANDS/UL (ref 149–390)
PMV BLD AUTO: 9.2 FL (ref 8.9–12.7)
POTASSIUM SERPL-SCNC: 4.1 MMOL/L (ref 3.5–5.3)
PROT SERPL-MCNC: 7.1 G/DL (ref 6.4–8.4)
PSA SERPL-MCNC: 0.8 NG/ML (ref 0–4)
RBC # BLD AUTO: 4.6 MILLION/UL (ref 3.88–5.62)
SODIUM SERPL-SCNC: 137 MMOL/L (ref 135–147)
TRIGL SERPL-MCNC: 78 MG/DL
TSH SERPL DL<=0.05 MIU/L-ACNC: 0.91 UIU/ML (ref 0.45–4.5)
WBC # BLD AUTO: 9.22 THOUSAND/UL (ref 4.31–10.16)

## 2023-01-09 LAB
EST. AVERAGE GLUCOSE BLD GHB EST-MCNC: 120 MG/DL
HBA1C MFR BLD: 5.8 %

## 2023-02-03 DIAGNOSIS — F32.A DEPRESSION, UNSPECIFIED DEPRESSION TYPE: ICD-10-CM

## 2023-02-15 ENCOUNTER — TELEPHONE (OUTPATIENT)
Dept: FAMILY MEDICINE CLINIC | Facility: CLINIC | Age: 53
End: 2023-02-15

## 2023-02-20 DIAGNOSIS — E78.2 MIXED HYPERLIPIDEMIA: ICD-10-CM

## 2023-02-20 DIAGNOSIS — I25.10 ASCVD (ARTERIOSCLEROTIC CARDIOVASCULAR DISEASE): ICD-10-CM

## 2023-02-20 RX ORDER — ATORVASTATIN CALCIUM 40 MG/1
TABLET, FILM COATED ORAL
Qty: 90 TABLET | Refills: 1 | Status: SHIPPED | OUTPATIENT
Start: 2023-02-20

## 2023-03-01 DIAGNOSIS — F32.A DEPRESSION, UNSPECIFIED DEPRESSION TYPE: ICD-10-CM

## 2023-03-31 DIAGNOSIS — F32.A DEPRESSION, UNSPECIFIED DEPRESSION TYPE: ICD-10-CM

## 2023-04-24 DIAGNOSIS — F32.A DEPRESSION, UNSPECIFIED DEPRESSION TYPE: ICD-10-CM

## 2023-04-24 NOTE — TELEPHONE ENCOUNTER
Last OV  6/28/21    Next OV 5/3/23      Please advise if OK to refill 30 day supply until upcoming appointment

## 2023-05-03 ENCOUNTER — OFFICE VISIT (OUTPATIENT)
Dept: FAMILY MEDICINE CLINIC | Facility: CLINIC | Age: 53
End: 2023-05-03

## 2023-05-03 VITALS
DIASTOLIC BLOOD PRESSURE: 80 MMHG | HEART RATE: 80 BPM | TEMPERATURE: 98.6 F | HEIGHT: 68 IN | OXYGEN SATURATION: 97 % | WEIGHT: 186 LBS | SYSTOLIC BLOOD PRESSURE: 120 MMHG | RESPIRATION RATE: 16 BRPM | BODY MASS INDEX: 28.19 KG/M2

## 2023-05-03 DIAGNOSIS — Z13.29 SCREENING FOR THYROID DISORDER: ICD-10-CM

## 2023-05-03 DIAGNOSIS — K21.00 GASTROESOPHAGEAL REFLUX DISEASE WITH ESOPHAGITIS, UNSPECIFIED WHETHER HEMORRHAGE: ICD-10-CM

## 2023-05-03 DIAGNOSIS — F32.A DEPRESSION, UNSPECIFIED DEPRESSION TYPE: ICD-10-CM

## 2023-05-03 DIAGNOSIS — Z12.11 SCREEN FOR COLON CANCER: ICD-10-CM

## 2023-05-03 DIAGNOSIS — L72.3 SEBACEOUS CYST: ICD-10-CM

## 2023-05-03 DIAGNOSIS — Z12.5 SCREENING FOR PROSTATE CANCER: ICD-10-CM

## 2023-05-03 DIAGNOSIS — G43.809 OTHER MIGRAINE WITHOUT STATUS MIGRAINOSUS, NOT INTRACTABLE: ICD-10-CM

## 2023-05-03 DIAGNOSIS — Z72.0 TOBACCO USE: ICD-10-CM

## 2023-05-03 DIAGNOSIS — E78.2 MIXED HYPERLIPIDEMIA: ICD-10-CM

## 2023-05-03 DIAGNOSIS — N52.03 COMBINED ARTERIAL INSUFFICIENCY AND CORPORO-VENOUS OCCLUSIVE ERECTILE DYSFUNCTION: ICD-10-CM

## 2023-05-03 DIAGNOSIS — Z00.00 WELL ADULT EXAM: Primary | ICD-10-CM

## 2023-05-03 DIAGNOSIS — R73.9 ELEVATED BLOOD SUGAR: ICD-10-CM

## 2023-05-03 DIAGNOSIS — Z13.0 SCREENING FOR DEFICIENCY ANEMIA: ICD-10-CM

## 2023-05-03 PROBLEM — IMO0002 CHRONIC MIGRAINE: Status: RESOLVED | Noted: 2018-12-13 | Resolved: 2023-05-03

## 2023-05-03 PROBLEM — G43.909 MIGRAINE: Status: ACTIVE | Noted: 2018-12-13

## 2023-05-03 NOTE — ASSESSMENT & PLAN NOTE
Patient has sebaceous cyst upper back  Patient is interested in excision    We will schedule surgery appointment

## 2023-05-03 NOTE — PROGRESS NOTES
Name: Sreedhar Head      : 1970      MRN: [id number]  Encounter Provider: Linda Sue DO  Encounter Date: 5/3/2023   Encounter department: [address] [place] [address]     1  Well adult exam    2  Depression, unspecified depression type  Assessment & Plan:  Patient continues to do well on sertraline 75 mg daily  3  Mixed hyperlipidemia  Assessment & Plan:  Cholesterol from 2023 was 118, LDL 63  Doing well on atorvastatin 40 mg daily  Orders:  -     Lipid Panel with Direct LDL reflex; Future; Expected date: 2024    4  Tobacco use  Assessment & Plan:  Still smoking 1 pack/day  Counseled again today  Patient due for LDCT chest   Order again placed today    Orders:  -     CT lung screening program; Future; Expected date: 2023    5  Gastroesophageal reflux disease with esophagitis, unspecified whether hemorrhage  Assessment & Plan:  Doing well on omeprazole 20 mg daily without breakthrough symptoms      6  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 dysfunction    7  Elevated blood sugar  Assessment & Plan:  History of mildly elevated blood sugar  Patient last A1c 5 8%  Patient states he drinks a considerable amount of soda, but recently has been decreasing this  We will continue to monitor  Orders:  -     Comprehensive metabolic panel; Future; Expected date: 2024  -     Hemoglobin A1C; Future; Expected date: 2024    8  Other migraine without status migrainosus, not intractable  Assessment & Plan:  Doing well with rare/occasional use of butalbital acetaminophen      9  Screening for deficiency anemia  -     CBC and differential; Future; Expected date: 2024    10  Screening for thyroid disorder  -     TSH, 3rd generation with Free T4 reflex; Future; Expected date: 2024    11  Screening for prostate cancer  -     PSA, Total Screen; Future; Expected date: 2024    12   Sebaceous cyst  Assessment & Plan:  Patient has sebaceous cyst upper back  Patient is interested in excision  We will schedule surgery appointment        Patient presents for 47year-old physical examination today  He has been complaining of some various muscle aches recently, otherwise is doing well  He states his diet is somewhat healthy, but room for improvement  He is not engaged in any formal exercise routine, but states he is very active at work  Still smoking 1 pack/day  He has not had any alcohol in 16 years  He drinks 2 cups of coffee per day  His examination today was unremarkable  Recommend healthy diet and regular exercise program (at least 150 minutes of aerobic exercise weekly)  Labs from January 7, 2023 reviewed    Due for colon cancer screening  Recommend Cologuard  Order placed    Yearly, labs prior       Subjective     Patient presents for 43-year-old physical examination today  He has been complaining of some various muscle aches recently, otherwise is doing well  He states his diet is somewhat healthy, but room for improvement  He is not engaged in any formal exercise routine, but states he is very active at work  Still smoking 1 pack/day  He has not had any alcohol in 16 years  He drinks 2 cups of coffee per day  Review of Systems   Respiratory: Negative  Cardiovascular: Negative  Gastrointestinal: Negative  Genitourinary: Negative          Past Medical History:   Diagnosis Date    [facility]     Cellulitis of buccal space of mouth     Last assessed: 4/17/2017    Dog bite of multiple sites     Last assessed: 4/6/2016    Eczema     Last assessed: 12/7/2012    Epididymitis     Last assessed: 9/18/2016    Hypertension     Last assessed: 7/7/2015    Tinea corporis     Last assessed: 11/15/2013     Past Surgical History:   Procedure Laterality Date    APPENDECTOMY      ELBOW SURGERY      HAND SURGERY      KNEE SURGERY      SHOULDER SURGERY       Family History   Problem Relation Age of Onset    Other Mother         Solitary kidney    Prostate cancer Father     Substance Abuse Neg Hx      Social History     Socioeconomic History    Marital status: /Civil Union     Spouse name: None    Number of children: None    Years of education: None    Highest education level: None   Occupational History    None   Tobacco Use    Smoking status: Every Day    Smokeless tobacco: Never   Substance and Sexual Activity    Alcohol use: No     Comment: Stopped drinking alcohol    Drug use: None    Sexual activity: None   Other Topics Concern    None   Social History Narrative    None     Social Determinants of Health     Financial Resource Strain: Not on file   Food Insecurity: Not on file   Transportation Needs: Not on file   Physical Activity: Not on file   Stress: Not on file   Social Connections: Not on file   Intimate Partner Violence: Not on file   Housing Stability: Not on file     Current Outpatient Medications on File Prior to Visit   Medication Sig    atorvastatin (LIPITOR) 40 mg tablet take 1 tablet by mouth once daily    butalbital-acetaminophen-caffeine (FIORICET,ESGIC) -40 mg per tablet Take 1 tablet by mouth every 6 (six) hours as needed for headaches    omeprazole (PriLOSEC) 20 mg delayed release capsule Take 1 capsule (20 mg total) by mouth daily    sertraline (ZOLOFT) 50 mg tablet take 1 & 1/2 tablets by mouth daily    [DISCONTINUED] methylPREDNISolone acetate (DEPO-MEDROL) 40 mg/mL injection methylprednisolone acetate 40 mg/mL suspension for injection   in conjunction with Lidocaine (Patient not taking: No sig reported)     Allergies   Allergen Reactions    Aspirin Vomiting    Erythromycin Rash    Penicillins Rash     Immunization History   Administered Date(s) Administered    COVID-19 PFIZER VACCINE 0 3 ML IM 03/27/2021, 04/17/2021    Tdap 03/29/2016       Objective     /80 (BP Location: Left arm, Patient Position: Sitting, Cuff Size: Large) "Pulse 80   Temp 98 6 °F (37 °C) (Temporal)   Resp 16   Ht 5' 7 72\" (1 72 m)   Wt 84 4 kg (186 lb)   SpO2 97%   BMI 28 52 kg/m²     Physical Exam  Cardiovascular:      Rate and Rhythm: Normal rate and regular rhythm  Heart sounds: Normal heart sounds  Comments: Carotids: no bruits  Ext: no edema  Pulmonary:      Effort: Pulmonary effort is normal  No respiratory distress  Breath sounds: No wheezing or rales  Psychiatric:         Behavior: Behavior normal          Thought Content:  Thought content normal        Stephani Olmstead DO"

## 2023-05-03 NOTE — ASSESSMENT & PLAN NOTE
Still smoking 1 pack/day  Counseled again today    Patient due for LDCT chest   Order again placed today

## 2023-05-03 NOTE — ASSESSMENT & PLAN NOTE
History of mildly elevated blood sugar  Patient last A1c 5 8%  Patient states he drinks a considerable amount of soda, but recently has been decreasing this  We will continue to monitor

## 2023-05-24 DIAGNOSIS — F32.A DEPRESSION, UNSPECIFIED DEPRESSION TYPE: ICD-10-CM

## 2023-05-24 NOTE — TELEPHONE ENCOUNTER
Received fax thru [employer]    Sertraline hcl 50 mg tab  take 1 and 1/2 tabs by mouth once daily  #45  [facility] [employer]

## 2023-06-15 DIAGNOSIS — K21.9 GASTROESOPHAGEAL REFLUX DISEASE: ICD-10-CM

## 2023-06-15 RX ORDER — OMEPRAZOLE 20 MG/1
20 CAPSULE, DELAYED RELEASE ORAL DAILY
Qty: 90 CAPSULE | Refills: 1 | Status: SHIPPED | OUTPATIENT
Start: 2023-06-15

## 2023-08-14 DIAGNOSIS — E78.2 MIXED HYPERLIPIDEMIA: ICD-10-CM

## 2023-08-14 DIAGNOSIS — I25.10 ASCVD (ARTERIOSCLEROTIC CARDIOVASCULAR DISEASE): ICD-10-CM

## 2023-08-14 RX ORDER — ATORVASTATIN CALCIUM 40 MG/1
TABLET, FILM COATED ORAL
Qty: 90 TABLET | Refills: 1 | Status: SHIPPED | OUTPATIENT
Start: 2023-08-14

## 2023-09-07 ENCOUNTER — TELEPHONE (OUTPATIENT)
Dept: CARDIOLOGY CLINIC | Facility: CLINIC | Age: 53
End: 2023-09-07

## 2023-09-07 DIAGNOSIS — E78.2 MIXED HYPERLIPIDEMIA: ICD-10-CM

## 2023-09-07 DIAGNOSIS — K21.9 GASTROESOPHAGEAL REFLUX DISEASE: ICD-10-CM

## 2023-09-07 DIAGNOSIS — F32.A DEPRESSION, UNSPECIFIED DEPRESSION TYPE: ICD-10-CM

## 2023-09-07 DIAGNOSIS — I25.10 ASCVD (ARTERIOSCLEROTIC CARDIOVASCULAR DISEASE): ICD-10-CM

## 2023-09-07 RX ORDER — ATORVASTATIN CALCIUM 40 MG/1
40 TABLET, FILM COATED ORAL DAILY
Qty: 90 TABLET | Refills: 0 | Status: SHIPPED | OUTPATIENT
Start: 2023-09-07

## 2023-09-07 RX ORDER — OMEPRAZOLE 20 MG/1
20 CAPSULE, DELAYED RELEASE ORAL DAILY
Qty: 90 CAPSULE | Refills: 0 | Status: SHIPPED | OUTPATIENT
Start: 2023-09-07

## 2023-09-07 NOTE — TELEPHONE ENCOUNTER
Called pt left detailed message to call for over due return visit meds were refilled with no refills

## 2023-09-11 ENCOUNTER — TELEPHONE (OUTPATIENT)
Dept: CARDIOLOGY CLINIC | Facility: CLINIC | Age: 53
End: 2023-09-11

## 2023-09-13 ENCOUNTER — PROCEDURE VISIT (OUTPATIENT)
Dept: FAMILY MEDICINE CLINIC | Facility: CLINIC | Age: 53
End: 2023-09-13
Payer: COMMERCIAL

## 2023-09-13 VITALS
DIASTOLIC BLOOD PRESSURE: 76 MMHG | WEIGHT: 182 LBS | OXYGEN SATURATION: 97 % | TEMPERATURE: 98 F | HEART RATE: 84 BPM | BODY MASS INDEX: 27.9 KG/M2 | RESPIRATION RATE: 16 BRPM | SYSTOLIC BLOOD PRESSURE: 122 MMHG

## 2023-09-13 DIAGNOSIS — L72.3 SEBACEOUS CYST: Primary | ICD-10-CM

## 2023-09-13 PROCEDURE — 11403 EXC TR-EXT B9+MARG 2.1-3CM: CPT | Performed by: FAMILY MEDICINE

## 2023-09-13 NOTE — PROGRESS NOTES
Skin excision    Date/Time: 9/13/2023 2:00 PM    Performed by: Carlos A Brooks DO  Authorized by: Carlos A Brooks DO  Universal Protocol:  Consent: Verbal consent obtained. Consent given by: patient  Patient understanding: patient states understanding of the procedure being performed  Patient consent: the patient's understanding of the procedure matches consent given  Site marked: the operative site was marked  Patient identity confirmed: verbally with patient      Procedure Details - Skin Excision:     Number of Lesions:  1  Lesion 1:     Body area:  Trunk    Trunk location:  Back    Malignancy: benign lesion        Initial size (mm):  22        Final defect size (mm):  22    Destruction method: scissors used for extraction    Repair type:  Linear closure    Closure complexity: simple      Repair size (cm):  0.6    Repair size (sq cm):  0.6     Repair Comments: Large sebaceous/pilar cyst upper back present for years. Getting more irritating. Measuring 22 mm diameter. Sterile prep. Anesthetized with lidocaine 1% plain. Cyst completely excised (including cyst sac). Closed with Ethilon 5-0 (2 sutures). Patient tolerated the procedure well. Sterile bandage applied. Wound care instructions given.   Recheck 7 days for suture removal

## 2023-09-20 ENCOUNTER — OFFICE VISIT (OUTPATIENT)
Dept: FAMILY MEDICINE CLINIC | Facility: CLINIC | Age: 53
End: 2023-09-20

## 2023-09-20 VITALS
RESPIRATION RATE: 18 BRPM | TEMPERATURE: 97.8 F | BODY MASS INDEX: 28.06 KG/M2 | DIASTOLIC BLOOD PRESSURE: 80 MMHG | HEART RATE: 79 BPM | SYSTOLIC BLOOD PRESSURE: 124 MMHG | WEIGHT: 183 LBS | OXYGEN SATURATION: 95 %

## 2023-09-20 DIAGNOSIS — L72.3 SEBACEOUS CYST: Primary | ICD-10-CM

## 2023-09-20 PROCEDURE — 99024 POSTOP FOLLOW-UP VISIT: CPT | Performed by: FAMILY MEDICINE

## 2023-09-20 NOTE — PROGRESS NOTES
Patient presents for follow-up. He had sebaceous cyst excised on September 13. He has been doing well since that time without complaints    Exam: Upper back incision healing well.  2 sutures in place. No signs of infection    A/P: Sebaceous cyst upper back: 2 sutures removed today without problems. Well-healed.   Call further problems

## 2023-10-16 DIAGNOSIS — F32.A DEPRESSION, UNSPECIFIED DEPRESSION TYPE: ICD-10-CM

## 2023-12-01 DIAGNOSIS — K21.9 GASTROESOPHAGEAL REFLUX DISEASE: ICD-10-CM

## 2023-12-01 RX ORDER — OMEPRAZOLE 20 MG/1
20 CAPSULE, DELAYED RELEASE ORAL DAILY
Qty: 90 CAPSULE | Refills: 1 | Status: SHIPPED | OUTPATIENT
Start: 2023-12-01

## 2024-02-21 PROBLEM — Z00.00 WELL ADULT EXAM: Status: RESOLVED | Noted: 2018-12-13 | Resolved: 2024-02-21

## 2024-03-29 DIAGNOSIS — F32.A DEPRESSION, UNSPECIFIED DEPRESSION TYPE: ICD-10-CM

## 2024-05-16 DIAGNOSIS — Z13.0 SCREENING FOR DEFICIENCY ANEMIA: ICD-10-CM

## 2024-05-16 DIAGNOSIS — Z13.29 SCREENING FOR THYROID DISORDER: ICD-10-CM

## 2024-05-16 DIAGNOSIS — R73.9 ELEVATED BLOOD SUGAR: ICD-10-CM

## 2024-05-16 DIAGNOSIS — Z12.5 SCREENING FOR PROSTATE CANCER: ICD-10-CM

## 2024-05-16 DIAGNOSIS — E78.2 MIXED HYPERLIPIDEMIA: Primary | ICD-10-CM

## 2024-05-20 DIAGNOSIS — K21.9 GASTROESOPHAGEAL REFLUX DISEASE: ICD-10-CM

## 2024-05-20 RX ORDER — OMEPRAZOLE 20 MG/1
20 CAPSULE, DELAYED RELEASE ORAL DAILY
Qty: 90 CAPSULE | Refills: 1 | Status: SHIPPED | OUTPATIENT
Start: 2024-05-20

## 2024-06-08 ENCOUNTER — APPOINTMENT (OUTPATIENT)
Dept: LAB | Facility: MEDICAL CENTER | Age: 54
End: 2024-06-08
Payer: COMMERCIAL

## 2024-06-08 DIAGNOSIS — Z12.5 SCREENING FOR PROSTATE CANCER: ICD-10-CM

## 2024-06-08 DIAGNOSIS — Z13.0 SCREENING FOR DEFICIENCY ANEMIA: ICD-10-CM

## 2024-06-08 DIAGNOSIS — E78.2 MIXED HYPERLIPIDEMIA: ICD-10-CM

## 2024-06-08 DIAGNOSIS — R73.9 ELEVATED BLOOD SUGAR: ICD-10-CM

## 2024-06-08 DIAGNOSIS — Z13.29 SCREENING FOR THYROID DISORDER: ICD-10-CM

## 2024-06-08 LAB
ALBUMIN SERPL BCP-MCNC: 4.3 G/DL (ref 3.5–5)
ALP SERPL-CCNC: 55 U/L (ref 34–104)
ALT SERPL W P-5'-P-CCNC: 20 U/L (ref 7–52)
ANION GAP SERPL CALCULATED.3IONS-SCNC: 9 MMOL/L (ref 4–13)
AST SERPL W P-5'-P-CCNC: 24 U/L (ref 13–39)
BASOPHILS # BLD AUTO: 0.09 THOUSANDS/ÂΜL (ref 0–0.1)
BASOPHILS NFR BLD AUTO: 1 % (ref 0–1)
BILIRUB SERPL-MCNC: 0.64 MG/DL (ref 0.2–1)
BUN SERPL-MCNC: 10 MG/DL (ref 5–25)
CALCIUM SERPL-MCNC: 9.4 MG/DL (ref 8.4–10.2)
CHLORIDE SERPL-SCNC: 101 MMOL/L (ref 96–108)
CHOLEST SERPL-MCNC: 196 MG/DL
CO2 SERPL-SCNC: 27 MMOL/L (ref 21–32)
CREAT SERPL-MCNC: 0.78 MG/DL (ref 0.6–1.3)
EOSINOPHIL # BLD AUTO: 0.16 THOUSAND/ÂΜL (ref 0–0.61)
EOSINOPHIL NFR BLD AUTO: 2 % (ref 0–6)
ERYTHROCYTE [DISTWIDTH] IN BLOOD BY AUTOMATED COUNT: 13.4 % (ref 11.6–15.1)
EST. AVERAGE GLUCOSE BLD GHB EST-MCNC: 120 MG/DL
GFR SERPL CREATININE-BSD FRML MDRD: 102 ML/MIN/1.73SQ M
GLUCOSE P FAST SERPL-MCNC: 99 MG/DL (ref 65–99)
HBA1C MFR BLD: 5.8 %
HCT VFR BLD AUTO: 46.2 % (ref 36.5–49.3)
HDLC SERPL-MCNC: 37 MG/DL
HGB BLD-MCNC: 15.2 G/DL (ref 12–17)
IMM GRANULOCYTES # BLD AUTO: 0.03 THOUSAND/UL (ref 0–0.2)
IMM GRANULOCYTES NFR BLD AUTO: 0 % (ref 0–2)
LDLC SERPL CALC-MCNC: 140 MG/DL (ref 0–100)
LYMPHOCYTES # BLD AUTO: 2.03 THOUSANDS/ÂΜL (ref 0.6–4.47)
LYMPHOCYTES NFR BLD AUTO: 23 % (ref 14–44)
MCH RBC QN AUTO: 30.2 PG (ref 26.8–34.3)
MCHC RBC AUTO-ENTMCNC: 32.9 G/DL (ref 31.4–37.4)
MCV RBC AUTO: 92 FL (ref 82–98)
MONOCYTES # BLD AUTO: 0.67 THOUSAND/ÂΜL (ref 0.17–1.22)
MONOCYTES NFR BLD AUTO: 8 % (ref 4–12)
NEUTROPHILS # BLD AUTO: 5.72 THOUSANDS/ÂΜL (ref 1.85–7.62)
NEUTS SEG NFR BLD AUTO: 66 % (ref 43–75)
NRBC BLD AUTO-RTO: 0 /100 WBCS
PLATELET # BLD AUTO: 316 THOUSANDS/UL (ref 149–390)
PMV BLD AUTO: 9.5 FL (ref 8.9–12.7)
POTASSIUM SERPL-SCNC: 4.7 MMOL/L (ref 3.5–5.3)
PROT SERPL-MCNC: 7.1 G/DL (ref 6.4–8.4)
PSA SERPL-MCNC: 0.75 NG/ML (ref 0–4)
RBC # BLD AUTO: 5.04 MILLION/UL (ref 3.88–5.62)
SODIUM SERPL-SCNC: 137 MMOL/L (ref 135–147)
TRIGL SERPL-MCNC: 96 MG/DL
TSH SERPL DL<=0.05 MIU/L-ACNC: 1.38 UIU/ML (ref 0.45–4.5)
WBC # BLD AUTO: 8.7 THOUSAND/UL (ref 4.31–10.16)

## 2024-06-08 PROCEDURE — 80053 COMPREHEN METABOLIC PANEL: CPT

## 2024-06-08 PROCEDURE — 83036 HEMOGLOBIN GLYCOSYLATED A1C: CPT

## 2024-06-08 PROCEDURE — 85025 COMPLETE CBC W/AUTO DIFF WBC: CPT

## 2024-06-08 PROCEDURE — 36415 COLL VENOUS BLD VENIPUNCTURE: CPT

## 2024-06-08 PROCEDURE — G0103 PSA SCREENING: HCPCS

## 2024-06-08 PROCEDURE — 84443 ASSAY THYROID STIM HORMONE: CPT

## 2024-06-08 PROCEDURE — 80061 LIPID PANEL: CPT

## 2024-07-10 ENCOUNTER — OFFICE VISIT (OUTPATIENT)
Dept: FAMILY MEDICINE CLINIC | Facility: CLINIC | Age: 54
End: 2024-07-10
Payer: COMMERCIAL

## 2024-07-10 VITALS
BODY MASS INDEX: 26.86 KG/M2 | DIASTOLIC BLOOD PRESSURE: 80 MMHG | WEIGHT: 177.2 LBS | SYSTOLIC BLOOD PRESSURE: 116 MMHG | OXYGEN SATURATION: 95 % | HEIGHT: 68 IN | TEMPERATURE: 98.2 F | HEART RATE: 84 BPM

## 2024-07-10 DIAGNOSIS — I25.10 ASCVD (ARTERIOSCLEROTIC CARDIOVASCULAR DISEASE): ICD-10-CM

## 2024-07-10 DIAGNOSIS — E78.2 MIXED HYPERLIPIDEMIA: ICD-10-CM

## 2024-07-10 DIAGNOSIS — F32.A DEPRESSION, UNSPECIFIED DEPRESSION TYPE: Primary | ICD-10-CM

## 2024-07-10 DIAGNOSIS — G43.809 OTHER MIGRAINE WITHOUT STATUS MIGRAINOSUS, NOT INTRACTABLE: ICD-10-CM

## 2024-07-10 DIAGNOSIS — R73.9 ELEVATED BLOOD SUGAR: ICD-10-CM

## 2024-07-10 DIAGNOSIS — Z72.0 TOBACCO USE: ICD-10-CM

## 2024-07-10 DIAGNOSIS — Z12.11 SCREEN FOR COLON CANCER: ICD-10-CM

## 2024-07-10 DIAGNOSIS — K21.00 GASTROESOPHAGEAL REFLUX DISEASE WITH ESOPHAGITIS, UNSPECIFIED WHETHER HEMORRHAGE: ICD-10-CM

## 2024-07-10 DIAGNOSIS — M79.642 LEFT HAND PAIN: ICD-10-CM

## 2024-07-10 PROCEDURE — 99396 PREV VISIT EST AGE 40-64: CPT | Performed by: FAMILY MEDICINE

## 2024-07-10 RX ORDER — ATORVASTATIN CALCIUM 40 MG/1
40 TABLET, FILM COATED ORAL DAILY
Qty: 90 TABLET | Refills: 1 | Status: SHIPPED | OUTPATIENT
Start: 2024-07-10

## 2024-07-10 NOTE — ASSESSMENT & PLAN NOTE
Patient ran out of atorvastatin 40.  Cholesterol from June 8 196, .  Stressed compliance.  New refill given today.  Also recommend following up with his cardiologist in near future.

## 2024-07-10 NOTE — ASSESSMENT & PLAN NOTE
A1c from June 8 stable at 5.8%.  Patient recently reduced his soda consumption.  He states he was drinking almost the case a week.

## 2024-07-10 NOTE — ASSESSMENT & PLAN NOTE
Patient complaining of left hand pain second PIP joint.  Will check x-rays.  Patient also being followed by hand specialist

## 2024-07-10 NOTE — ASSESSMENT & PLAN NOTE
Still smoking.  Patient states a little less than a pack per day.  Counseled again.  Order given for LDCT chest.

## 2024-07-10 NOTE — PROGRESS NOTES
Ambulatory Visit  Name: Pratik Crooks      : 1970      MRN: [id number]  Encounter Provider: Jim Torres DO  Encounter Date: 7/10/2024   Encounter department: [facility]    Assessment & Plan   1. Depression, unspecified depression type  Assessment & Plan:  Continue sertraline 75 mg daily  2. Tobacco use  Assessment & Plan:  Still smoking.  Patient states a little less than a pack per day.  Counseled again.  Order given for LDCT chest.  Orders:  -     CT lung screening program; Future; Expected date: 07/10/2024  3. Mixed hyperlipidemia  Assessment & Plan:  Patient ran out of atorvastatin 40.  Cholesterol from  196, .  Stressed compliance.  New refill given today.  Also recommend following up with his cardiologist in near future.  Orders:  -     atorvastatin (LIPITOR) 40 mg tablet; Take 1 tablet (40 mg total) by mouth daily  4. Gastroesophageal reflux disease with esophagitis, unspecified whether hemorrhage  Assessment & Plan:  Doing well on omeprazole 20 mg daily    5. Elevated blood sugar  Assessment & Plan:  A1c from  stable at 5.8%.  Patient recently reduced his soda consumption.  He states he was drinking almost the case a week.  6. Other migraine without status migrainosus, not intractable  Assessment & Plan:  Doing well with as needed use of Fioricet  7. ASCVD (arteriosclerotic cardiovascular disease)  -     atorvastatin (LIPITOR) 40 mg tablet; Take 1 tablet (40 mg total) by mouth daily  8. Left hand pain  Assessment & Plan:  Patient complaining of left hand pain second PIP joint.  Will check x-rays.  Patient also being followed by hand specialist  Orders:  -     XR hand 2 vw left; Future; Expected date: 07/10/2024  9. Screen for colon cancer  -     Cologuard       Patient presents for 53-year-old physical and a checkup.  Overall he is doing well.  Still smoking approximately a day.  Has not drink alcohol in over 6 years.  He is compliant with prescribed  medications.  He had labs done on June 8.  Continue to work on healthy diet and regular exercise program.    Cologuard ordered today    Lab results from June 8 reviewed with patient    Last tetanus booster 2016  Consider Shingrix vaccination    Yearly/as needed    History of Present Illness     Patient presents for 53-year-old physical and a checkup.  Overall he is doing well.  Still smoking approximately a day.  Has not drink alcohol in over 6 years.  He is compliant with prescribed medications.  He had labs done on June 8.      Review of Systems   Respiratory: Negative.     Cardiovascular: Negative.    Gastrointestinal: Negative.    Genitourinary: Negative.      Past Medical History:   Diagnosis Date   • Alcoholism (HCC)    • Cellulitis of buccal space of mouth     Last assessed: 4/17/2017   • Dog bite of multiple sites     Last assessed: 4/6/2016   • Eczema     Last assessed: 12/7/2012   • Epididymitis     Last assessed: 9/18/2016   • Hypertension     Last assessed: 7/7/2015   • Tinea corporis     Last assessed: 11/15/2013     Past Surgical History:   Procedure Laterality Date   • APPENDECTOMY     • ELBOW SURGERY     • HAND SURGERY     • KNEE SURGERY     • SHOULDER SURGERY       Family History   Problem Relation Age of Onset   • Other Mother         Solitary kidney   • Prostate cancer Father    • Substance Abuse Neg Hx      Social History     Tobacco Use   • Smoking status: Every Day     Current packs/day: 1.00     Average packs/day: 1 pack/day for 25.0 years (25.0 ttl pk-yrs)     Types: Cigarettes     Start date: 7/10/1999   • Smokeless tobacco: Never   Vaping Use   • Vaping status: Never Used   Substance and Sexual Activity   • Alcohol use: No     Comment: Stopped drinking alcohol   • Drug use: Yes     Types: Marijuana   • Sexual activity: Not on file     Current Outpatient Medications on File Prior to Visit   Medication Sig   • butalbital-acetaminophen-caffeine (FIORICET,ESGIC) -40 mg per tablet Take 1  "tablet by mouth every 6 (six) hours as needed for headaches   • omeprazole (PriLOSEC) 20 mg delayed release capsule Take 1 capsule (20 mg total) by mouth daily   • sertraline (ZOLOFT) 50 mg tablet take 1 & 1/2 tablets by mouth daily   • [DISCONTINUED] atorvastatin (LIPITOR) 40 mg tablet Take 1 tablet (40 mg total) by mouth daily (Patient not taking: Reported on 7/10/2024)     Allergies   Allergen Reactions   • Aspirin Vomiting   • Erythromycin Rash   • Penicillins Rash     Immunization History   Administered Date(s) Administered   • COVID-19 PFIZER VACCINE 0.3 ML IM 03/27/2021, 04/17/2021, 12/04/2021   • Tdap 03/29/2016     Objective     /80 (BP Location: Left arm, Patient Position: Sitting, Cuff Size: Adult)   Pulse 84   Temp 98.2 °F (36.8 °C)   Ht 5' 8\" (1.727 m)   Wt 80.4 kg (177 lb 3.2 oz)   SpO2 95%   BMI 26.94 kg/m²     Physical Exam  Constitutional:       Appearance: Normal appearance.   Eyes:      Pupils: Pupils are equal, round, and reactive to light.   Neck:      Vascular: No carotid bruit.   Cardiovascular:      Rate and Rhythm: Normal rate and regular rhythm.      Pulses: Normal pulses.      Heart sounds: Normal heart sounds. No murmur heard.     No gallop.   Pulmonary:      Effort: Pulmonary effort is normal.      Breath sounds: Normal breath sounds.   Neurological:      Mental Status: He is alert.   Psychiatric:         Mood and Affect: Mood normal.         Behavior: Behavior normal.         "

## 2024-07-16 DIAGNOSIS — R25.2 LEG CRAMPS: Primary | ICD-10-CM

## 2024-07-16 RX ORDER — CYCLOBENZAPRINE HCL 10 MG
10 TABLET ORAL
Qty: 15 TABLET | Refills: 1 | Status: SHIPPED | OUTPATIENT
Start: 2024-07-16

## 2024-09-10 DIAGNOSIS — Z12.11 SCREEN FOR COLON CANCER: Primary | ICD-10-CM

## 2024-10-15 DIAGNOSIS — F32.A DEPRESSION, UNSPECIFIED DEPRESSION TYPE: ICD-10-CM

## 2024-11-05 DIAGNOSIS — K21.9 GASTROESOPHAGEAL REFLUX DISEASE: ICD-10-CM

## 2024-11-06 ENCOUNTER — TELEPHONE (OUTPATIENT)
Age: 54
End: 2024-11-06

## 2024-11-06 NOTE — TELEPHONE ENCOUNTER
Scheduled date of colonoscopy (as of today): 12/9/24  Physician performing colonoscopy: BATOOL  Location of colonoscopy: [facility]  Bowel prep reviewed with patient: YOVANY / JERRY  Instructions reviewed with patient by: ANA  Clearances:  N/A

## 2024-11-06 NOTE — TELEPHONE ENCOUNTER
11/06/24  Screened by: Daniela Cortes    Referring Provider     Pre- Screening:     There is no height or weight on file to calculate BMI.  Has patient been referred for a routine screening Colonoscopy? yes  Is the patient between 45-75 years old? yes      Previous Colonoscopy no   If yes:    Date:     Facility:     Reason:         Does the patient want to see a Gastroenterologist prior to their procedure OR are they having any GI symptoms? no    Has the patient been hospitalized or had abdominal surgery in the past 6 months? no    Does the patient use supplemental oxygen? no    Does the patient take Coumadin, Lovenox, Plavix, Elliquis, Xarelto, or other blood thinning medication? no    Has the patient had a stroke, cardiac event, or stent placed in the past year? no      If patient is between 45yrs - 49yrs, please advise patient that we will have to confirm benefits & coverage with their insurance company for a routine screening colonoscopy.

## 2024-11-06 NOTE — LETTER
November 6, 2024     Pratik Crooks    Patient: Pratik Crooks   YOB: 1970   Date of Visit: 11/6/2024       Dear Dr. Crooks:    Thank you for referring Pratik Crooks to me for evaluation. Below are my notes for this consultation.    If you have questions, please do not hesitate to call me. I look forward to following your patient along with you.         Sincerely,        Daniela Cortes        CC: No Recipients         COLONOSCOPY  MIRALAX/Dulcolax Bowel Preparation Instructions    The OR/GI Lab will contact you the evening prior to your procedure with your exact arrival time.    Our practice requires a 1 week notice for any cancellations or rescheduling. We kindly ask that you immediately notify us of any changes including any new medications that are prescribed. Thank you for your cooperation.     WEEK BEFORE YOUR PROCEDURE:  Stop taking Iron tablets.  5 days prior, AVOID vegetables and fruits with skins or seeds, nuts, corn, popcorn and whole grain breads.   Purchase: One (1) 238-gram container of Miralax (polyethylene glycol 3350), four (4) 5 mg Dulcolax (bisacodyl) tablets, and one (1) 64-ounce bottle of Gatorade (sports drink) - no red, orange, or purple. These may be purchased at any pharmacy without a prescription. Generic products are permissible.   Arrange responsible transportation for day of the procedure.     DAY BEFORE THE PROCEDURE:   CLEAR liquids only for entire day prior. Nothing red, orange or purple.    You MAY have:                                                               Soda  Water  Broth Gatorade  Jello  Popsicles Coffee/tea without milk/creamer     YOU MAY NOT HAVE:  Solid foods   Milk and milk products    Juice with pulp    BOWEL PREPARATION:  Includes: One (1) 238-gram container of Miralax (polyethylene glycol 3350), four (4) 5 mg Dulcolax (bisacodyl) tablets, and one (1) 64-ounce bottle of Gatorade (sports drink).  Preparation may be refrigerated.  Entire bowel prep  should be completed.     Afternoon before the procedure (2:00 pm - 5:00 pm):    Take two (2) 5 mg Dulcolax laxative tablets.     Evening before the procedure (6:00 pm):  Mix entire container of Miralax with one (1) 64-ounce bottle of Gatorade and shake until all medication is dissolved.   Begin drinking solution. Drink an eight (8) ounce cup every 10-15 minutes until you have consumed half (32 ounces) of the solution.  Refrigerate remaining solution.    Night before the procedure (8:00 pm):  Take two (2) 5 mg Dulcolax laxative tablets.     Beginning 5 hours before your procedure:  Drink the remaining amount of prepared solution (32 ounces).  Drink an eight (8) ounce cup every 10-15 minutes until you have consumed the remaining solution.     Bowel prep should be completed 4 hours prior to procedure time.    NOTHING TO EAT OR DRINK AFTER MIDNIGHT- EXCEPT FOR YOUR PREP    DAY OF THE PROCEDURE:  You may brush your teeth.  Leave all jewelry at home.  Please arrive for your procedure as indicated by the OR / GI Lab / Endoscopy Unit. The hospital will contact you the day before with your exact arrival time.   Make sure you have arranged ahead of time for a responsible adult (18 or older) to accompany and drive you home after the procedure.  Please discuss any transportation concerns with our staff prior to your procedure.    The effects of the anesthesia can persist for 24 hours.  After receiving the sedation, you must exercise caution before engaging in any activity that could harm yourself and others (such as driving a car).  Do not make any important decisions or do not drink any alcoholic beverages during this time period.  After your procedure, you may have anything you'd like to eat or drink.  You will probably want to start with something light.  Please include plenty of fluids.  Avoid items that cause gas such as sodas and salads.    SPECIAL INSTRUCTIONS:    For patients currently taking blood thinners and/or  antiplatelet therapy our office will contact the prescribing provider.  Our office will contact you with any required changes to your medication regimen.     Blood thinner (i.e. - Coumadin, Pradaxa, Lovenox, Xarelto, Eliquis)  ?  Continue (Do Not Stop)  ? Stop______________for_____________days prior to the procedure.    Antiplatelet (i.e. - Plavix, Aggrenox, Effient, Brilinta)  ?  Continue (Do Not Stop)  ? Stop______________for_____________days prior to the procedure.       Diabetes:   If you are Diabetic, please see separate Diabetic Instruction Sheet.          Prescribed medications:  Do not stop your aspirin, or any of your other medications (unless instructed otherwise).    Take the rest of your prescribed medications with small sips of water at least 2 hours prior to your procedure.      For any questions or concerns related to your bowel preparation or pre-procedure instructions, please contact our office at [phone].  Thank you for choosing [facility]!     Medicine Instructions for Adults with Diabetes who Need a Bowel Prep       Follow these instructions when a BOWEL PREP is required for your procedure or surgery!    NOTE:   GLP-1 Agonists taken weekly: do not take in the 7 days before your procedure   SGLT-2 Inhibitors: do not take in the 4 days before your procedure     On the Day Before Surgery/Procedure  If you are having a procedure (e.g. Colonoscopy) or surgery that requires a bowel prep and you may have at least a clear liquid diet, follow the directions below based on the type of medicine you take for your diabetes.     Type of Medicine You Take Examples What to do   Pre-Mixed Insulin - Intermediate Acting Humalog® 75/25, Humulin® 70/30, Novolog® 70/30, Regular Insulin Take ½ your regular dose the evening before your procedure   Rapid/Fast Acting Insulin Humalog® U200, NovoLog®, Apidra®, Fiasp® Take ½ your regular dose the evening before your procedure.   Long-Acting Insulin  Lantus®, Levemir®, Tresiba®, Toujeo®, Basaglar® Take your FULL regular dose the day before procedure   Oral Sulfonylurea Glipizide/Glimepiride/Glucotrol® Take ½ your regular dose the evening before your procedure   Other Oral Diabetes Medicines Metformin®, Glucophage®, Glucophage XR®, Riomet®, Glumetza®), Actose®, Avandia®, Glyset®, Prandin® Take your regular dose with dinner in the evening before your procedure   GLP-1 Agonists AdlyxinÒ, ByettaÒ, BydureonÒ, OzempicÒ, SoliquaÒ, TanzeumÒ, TrulicityÒ, VictozaÒ, Saxenda®, Rybelsus® If taken daily, take as normal    If taken weekly, do not take this medicine for 7 days before your procedure including the day of the procedure (resume taking after the procedure)   SGLT-2 Inhibitors Jardiance®, Invokana®, Farxiga®,   Steglatro®, Brenzavvy®, Qtern®, Segluromet®, Glyxambi®, Synjardy®, Synjardy XR®, Invokamet®, Invokamet XR®, Trijary XR®, Xigduo XR®, Steglujan® Do not take for 4 days before your procedure including the day of the procedure (resume taking after the procedure)                More information continued on back                    Medicine Instructions for Adults with Diabetes who Need a Bowel Prep  Page 2      On the Day of Surgery/Procedure  Follow the directions below based on the type of medicine you take for your diabetes.     Type of Medicine You Take Examples What to do   Long-Acting Insulin Lantus®, Levemir®, Tresiba®, Toujeo®, Basaglar®, Semglee®   If you usually take your Long-Acting Insulin in the morning, take the full dose as scheduled.   GLP-1 Agonists AdlyxinÒ, ByettaÒ, BydureonÒ, OzempicÒ, SoliquaÒ, TanzeumÒ, TrulicityÒ, VictozaÒ, Saxenda®, Rybelsus® Do NOT take this medicine on the day of your procedure (resume taking after the procedure)       On the Day of Surgery/Procedure (continued)  Except for the morning Long-Acting Insulin, DO NOT take ANY diabetic medicine on the day of your procedure unless you were instructed by the doctor who manages  your diabetes medicines.    Continue to check your blood sugars.  If you have an insulin pump, ask your endocrinologist for instructions at least 3 days before your procedure. NOTE: If you are not able to ask your endocrinologist in advance, on the day of the procedure set your insulin pump to your basal rate only. Bring your insulin pump supplies to the hospital.     If you have any questions about taking your diabetes medicines prior to your procedure, please contact the doctor who manages your diabetes medicines.

## 2024-11-23 ENCOUNTER — ANESTHESIA EVENT (OUTPATIENT)
Dept: ANESTHESIOLOGY | Facility: HOSPITAL | Age: 54
End: 2024-11-23

## 2024-11-23 ENCOUNTER — ANESTHESIA (OUTPATIENT)
Dept: ANESTHESIOLOGY | Facility: HOSPITAL | Age: 54
End: 2024-11-23

## 2024-12-30 DIAGNOSIS — E78.2 MIXED HYPERLIPIDEMIA: ICD-10-CM

## 2024-12-30 DIAGNOSIS — I25.10 ASCVD (ARTERIOSCLEROTIC CARDIOVASCULAR DISEASE): ICD-10-CM

## 2024-12-31 RX ORDER — ATORVASTATIN CALCIUM 40 MG/1
40 TABLET, FILM COATED ORAL DAILY
Qty: 90 TABLET | Refills: 1 | Status: SHIPPED | OUTPATIENT
Start: 2024-12-31

## 2025-01-06 ENCOUNTER — ANESTHESIA EVENT (OUTPATIENT)
Dept: ANESTHESIOLOGY | Facility: HOSPITAL | Age: 55
End: 2025-01-06

## 2025-01-06 ENCOUNTER — ANESTHESIA (OUTPATIENT)
Dept: ANESTHESIOLOGY | Facility: HOSPITAL | Age: 55
End: 2025-01-06

## 2025-01-20 ENCOUNTER — PREP FOR PROCEDURE (OUTPATIENT)
Dept: GASTROENTEROLOGY | Facility: CLINIC | Age: 55
End: 2025-01-20

## 2025-01-20 ENCOUNTER — HOSPITAL ENCOUNTER (OUTPATIENT)
Dept: GASTROENTEROLOGY | Facility: MEDICAL CENTER | Age: 55
Setting detail: OUTPATIENT SURGERY
Discharge: HOME | End: 2025-01-20
Attending: INTERNAL MEDICINE
Payer: COMMERCIAL

## 2025-01-20 ENCOUNTER — ANESTHESIA (OUTPATIENT)
Dept: GASTROENTEROLOGY | Facility: MEDICAL CENTER | Age: 55
End: 2025-01-20
Payer: COMMERCIAL

## 2025-01-20 ENCOUNTER — ANESTHESIA EVENT (OUTPATIENT)
Dept: GASTROENTEROLOGY | Facility: MEDICAL CENTER | Age: 55
End: 2025-01-20
Payer: COMMERCIAL

## 2025-01-20 VITALS
HEIGHT: 68 IN | OXYGEN SATURATION: 97 % | WEIGHT: 177 LBS | SYSTOLIC BLOOD PRESSURE: 110 MMHG | RESPIRATION RATE: 20 BRPM | BODY MASS INDEX: 26.83 KG/M2 | HEART RATE: 76 BPM | TEMPERATURE: 98 F | DIASTOLIC BLOOD PRESSURE: 83 MMHG

## 2025-01-20 DIAGNOSIS — Z12.11 SCREENING FOR COLON CANCER: Primary | ICD-10-CM

## 2025-01-20 DIAGNOSIS — Z12.11 SCREENING FOR COLON CANCER: ICD-10-CM

## 2025-01-20 PROCEDURE — 88305 TISSUE EXAM BY PATHOLOGIST: CPT | Performed by: PATHOLOGY

## 2025-01-20 PROCEDURE — 45385 COLONOSCOPY W/LESION REMOVAL: CPT | Performed by: INTERNAL MEDICINE

## 2025-01-20 RX ORDER — POLYETHYLENE GLYCOL 3350, SODIUM SULFATE ANHYDROUS, SODIUM BICARBONATE, SODIUM CHLORIDE, POTASSIUM CHLORIDE 236; 22.74; 6.74; 5.86; 2.97 G/4L; G/4L; G/4L; G/4L; G/4L
4000 POWDER, FOR SOLUTION ORAL ONCE
Qty: 4000 ML | Refills: 0 | Status: SHIPPED | OUTPATIENT
Start: 2025-01-20 | End: 2025-01-20

## 2025-01-20 RX ORDER — PROPOFOL 10 MG/ML
INJECTION, EMULSION INTRAVENOUS AS NEEDED
Status: SHIPPED | OUTPATIENT
Start: 2025-01-20

## 2025-01-20 RX ORDER — SODIUM CHLORIDE, SODIUM LACTATE, POTASSIUM CHLORIDE, CALCIUM CHLORIDE 600; 310; 30; 20 MG/100ML; MG/100ML; MG/100ML; MG/100ML
125 INJECTION, SOLUTION INTRAVENOUS CONTINUOUS
Status: DISCONTINUED | OUTPATIENT
Start: 2025-01-20 | End: 2025-01-24 | Stop reason: HOSPADM

## 2025-01-20 RX ORDER — ONDANSETRON 2 MG/ML
4 INJECTION INTRAMUSCULAR; INTRAVENOUS ONCE AS NEEDED
Status: DISCONTINUED | OUTPATIENT
Start: 2025-01-20 | End: 2025-01-24 | Stop reason: HOSPADM

## 2025-01-20 RX ADMIN — PROPOFOL 50 MG: 10 INJECTION, EMULSION INTRAVENOUS at 13:03

## 2025-01-20 RX ADMIN — PROPOFOL 50 MG: 10 INJECTION, EMULSION INTRAVENOUS at 13:11

## 2025-01-20 RX ADMIN — PROPOFOL 20 MG: 10 INJECTION, EMULSION INTRAVENOUS at 12:59

## 2025-01-20 RX ADMIN — SODIUM CHLORIDE, SODIUM LACTATE, POTASSIUM CHLORIDE, AND CALCIUM CHLORIDE 125 ML/HR: .6; .31; .03; .02 INJECTION, SOLUTION INTRAVENOUS at 12:49

## 2025-01-20 RX ADMIN — PROPOFOL 50 MG: 10 INJECTION, EMULSION INTRAVENOUS at 13:10

## 2025-01-20 RX ADMIN — PROPOFOL 50 MG: 10 INJECTION, EMULSION INTRAVENOUS at 13:13

## 2025-01-20 RX ADMIN — PROPOFOL 50 MG: 10 INJECTION, EMULSION INTRAVENOUS at 13:07

## 2025-01-20 RX ADMIN — PROPOFOL 50 MG: 10 INJECTION, EMULSION INTRAVENOUS at 13:01

## 2025-01-20 RX ADMIN — PROPOFOL 50 MG: 10 INJECTION, EMULSION INTRAVENOUS at 13:19

## 2025-01-20 RX ADMIN — PROPOFOL 30 MG: 10 INJECTION, EMULSION INTRAVENOUS at 12:58

## 2025-01-20 RX ADMIN — PROPOFOL 50 MG: 10 INJECTION, EMULSION INTRAVENOUS at 13:05

## 2025-01-20 RX ADMIN — PROPOFOL 50 MG: 10 INJECTION, EMULSION INTRAVENOUS at 13:00

## 2025-01-20 RX ADMIN — PROPOFOL 100 MG: 10 INJECTION, EMULSION INTRAVENOUS at 12:55

## 2025-01-20 NOTE — H&P
"History and Physical -  [facility]  Pratik Crooks 54 y.o. male MRN: [id number]    HPI: Pratik Crooks is a 54 y.o. year old male who presents for screening colonoscopy.     Last Hg   Lab Results   Component Value Date    HGB 15.2 06/08/2024     Last INR No results found for: \"INR\"  Last Platelets   Lab Results   Component Value Date     06/08/2024       Review of Systems    Historical Information   Past Medical History:   Diagnosis Date    Alcoholism (HCC)     Arthritis     Cellulitis of buccal space of mouth     Last assessed: 4/17/2017    Dog bite of multiple sites     Last assessed: 4/6/2016    Eczema     Last assessed: 12/7/2012    Epididymitis     Last assessed: 9/18/2016    Hypertension     Last assessed: 7/7/2015    Tinea corporis     Last assessed: 11/15/2013     Past Surgical History:   Procedure Laterality Date    APPENDECTOMY      ELBOW SURGERY      HAND SURGERY      KNEE SURGERY      SHOULDER SURGERY       Social History   Social History     Substance and Sexual Activity   Alcohol Use No    Comment: Stopped drinking alcohol     Social History     Substance and Sexual Activity   Drug Use Yes    Types: Marijuana     Social History     Tobacco Use   Smoking Status Every Day    Current packs/day: 1.00    Average packs/day: 1 pack/day for 25.5 years (25.5 ttl pk-yrs)    Types: Cigarettes    Start date: 7/10/1999   Smokeless Tobacco Never     Family History   Problem Relation Age of Onset    Other Mother         Solitary kidney    Prostate cancer Father     Substance Abuse Neg Hx        Meds/Allergies     Not in a hospital admission.    Allergies   Allergen Reactions    Aspirin Vomiting    Erythromycin Rash    Penicillins Rash       Objective     /81   Pulse 94   Temp 98 °F (36.7 °C) (Temporal)   Resp 16   Ht 5' 8\" (1.727 m)   Wt 80.3 kg (177 lb)   SpO2 98%   BMI 26.91 kg/m²     PRIOR GI PROCEDURES      PHYSICAL EXAM    Gen: NAD  CV: RRR  CHEST: Clear  ABD: soft, " NT/ND  EXT: no edema  Neuro: AAO    ASSESSMENT/PLAN:  This is a 54 y.o. year old male here for colonoscopy    Plan/Procedure: colonoscopy

## 2025-01-20 NOTE — ANESTHESIA POSTPROCEDURE EVALUATION
Post-Op Assessment Note    CV Status:  Stable    Pain management: adequate       Mental Status:  Somnolent     Post Op Vitals Reviewed: Yes    No anethesia notable event occurred.    Staff: CRNA           Last Filed PACU Vitals:  Vitals Value Taken Time   Temp     Pulse 87 01/20/25 1327   /70 01/20/25 1327   Resp 16 01/20/25 1327   SpO2 96 % 01/20/25 1327

## 2025-01-20 NOTE — ANESTHESIA PREPROCEDURE EVALUATION
Procedure:  COLONOSCOPY    Relevant Problems   CARDIO   (+) Migraine   (+) Mixed hyperlipidemia      /RENAL   (+) Benign prostatic hyperplasia without lower urinary tract symptoms      NEURO/PSYCH   (+) Depression   (+) Migraine        Physical Exam    Airway    Mallampati score: II  TM Distance: >3 FB  Neck ROM: full     Dental       Cardiovascular  Rhythm: regular, No weak pulses    Pulmonary   No stridor    Other Findings        Anesthesia Plan  ASA Score- 2     Anesthesia Type- IV sedation with anesthesia with ASA Monitors.         Additional Monitors:     Airway Plan:            Plan Factors-    Chart reviewed.   Existing labs reviewed. Patient summary reviewed.                  Induction- intravenous.    Postoperative Plan-         Informed Consent- Anesthetic plan and risks discussed with patient.  I personally reviewed this patient with the CRNA. Discussed and agreed on the Anesthesia Plan with the CRNA..      NPO Status:  No vitals data found for the desired time range.

## 2025-01-21 ENCOUNTER — TELEPHONE (OUTPATIENT)
Dept: GASTROENTEROLOGY | Facility: CLINIC | Age: 55
End: 2025-01-21

## 2025-01-21 NOTE — TELEPHONE ENCOUNTER
----- Message from Mert MARTINES sent at 1/20/2025  1:35 PM EST -----    ----- Message -----  From: Tomy Hicks MD  Sent: 1/20/2025   1:32 PM EST  To: Petrona Abad DO; #    Hi,    Can you please schedule this patient for a repeat colonoscopy with Dr. Abad in 2-3 months at [place]?    Thanks,  Tomy

## 2025-01-21 NOTE — TELEPHONE ENCOUNTER
Scheduled date of colonoscopy (as of today): 3/28/25  Physician performing colonoscopy: Dr. Abad  Location of colonoscopy: [place]   Bowel prep reviewed with patient: MIRALAX  Instructions reviewed with patient by: SAY  Clearances: N/A

## 2025-01-24 PROCEDURE — 88305 TISSUE EXAM BY PATHOLOGIST: CPT | Performed by: PATHOLOGY

## 2025-01-27 ENCOUNTER — RESULTS FOLLOW-UP (OUTPATIENT)
Dept: GASTROENTEROLOGY | Facility: CLINIC | Age: 55
End: 2025-01-27

## 2025-03-14 ENCOUNTER — TELEPHONE (OUTPATIENT)
Dept: PREADMISSION TESTING | Facility: HOSPITAL | Age: 55
End: 2025-03-14

## 2025-03-28 ENCOUNTER — HOSPITAL ENCOUNTER (OUTPATIENT)
Dept: GASTROENTEROLOGY | Facility: HOSPITAL | Age: 55
Setting detail: OUTPATIENT SURGERY
End: 2025-03-28
Payer: COMMERCIAL

## 2025-03-28 ENCOUNTER — ANESTHESIA EVENT (OUTPATIENT)
Dept: GASTROENTEROLOGY | Facility: HOSPITAL | Age: 55
End: 2025-03-28
Payer: COMMERCIAL

## 2025-03-28 ENCOUNTER — ANESTHESIA (OUTPATIENT)
Dept: GASTROENTEROLOGY | Facility: HOSPITAL | Age: 55
End: 2025-03-28
Payer: COMMERCIAL

## 2025-03-28 VITALS
RESPIRATION RATE: 17 BRPM | DIASTOLIC BLOOD PRESSURE: 58 MMHG | OXYGEN SATURATION: 96 % | HEART RATE: 60 BPM | SYSTOLIC BLOOD PRESSURE: 93 MMHG | TEMPERATURE: 96.7 F

## 2025-03-28 DIAGNOSIS — Z12.11 SCREENING FOR COLON CANCER: ICD-10-CM

## 2025-03-28 PROBLEM — IMO0001 SMOKING: Status: ACTIVE | Noted: 2025-03-28

## 2025-03-28 PROBLEM — F17.200 SMOKING: Status: ACTIVE | Noted: 2025-03-28

## 2025-03-28 PROCEDURE — 45385 COLONOSCOPY W/LESION REMOVAL: CPT | Performed by: INTERNAL MEDICINE

## 2025-03-28 PROCEDURE — 45380 COLONOSCOPY AND BIOPSY: CPT | Performed by: INTERNAL MEDICINE

## 2025-03-28 PROCEDURE — 88305 TISSUE EXAM BY PATHOLOGIST: CPT | Performed by: PATHOLOGY

## 2025-03-28 RX ORDER — LIDOCAINE HYDROCHLORIDE 10 MG/ML
INJECTION, SOLUTION EPIDURAL; INFILTRATION; INTRACAUDAL; PERINEURAL AS NEEDED
Status: DISCONTINUED | OUTPATIENT
Start: 2025-03-28 | End: 2025-03-28

## 2025-03-28 RX ORDER — PROPOFOL 10 MG/ML
INJECTION, EMULSION INTRAVENOUS CONTINUOUS PRN
Status: DISCONTINUED | OUTPATIENT
Start: 2025-03-28 | End: 2025-03-28

## 2025-03-28 RX ORDER — FENTANYL CITRATE 50 UG/ML
INJECTION, SOLUTION INTRAMUSCULAR; INTRAVENOUS AS NEEDED
Status: DISCONTINUED | OUTPATIENT
Start: 2025-03-28 | End: 2025-03-28

## 2025-03-28 RX ORDER — PROPOFOL 10 MG/ML
INJECTION, EMULSION INTRAVENOUS AS NEEDED
Status: DISCONTINUED | OUTPATIENT
Start: 2025-03-28 | End: 2025-03-28

## 2025-03-28 RX ORDER — PHENYLEPHRINE HCL IN 0.9% NACL 1 MG/10 ML
SYRINGE (ML) INTRAVENOUS AS NEEDED
Status: DISCONTINUED | OUTPATIENT
Start: 2025-03-28 | End: 2025-03-28

## 2025-03-28 RX ORDER — SODIUM CHLORIDE, SODIUM LACTATE, POTASSIUM CHLORIDE, CALCIUM CHLORIDE 600; 310; 30; 20 MG/100ML; MG/100ML; MG/100ML; MG/100ML
125 INJECTION, SOLUTION INTRAVENOUS CONTINUOUS
Status: DISPENSED | OUTPATIENT
Start: 2025-03-28

## 2025-03-28 RX ADMIN — PROPOFOL 50 MG: 10 INJECTION, EMULSION INTRAVENOUS at 11:32

## 2025-03-28 RX ADMIN — LIDOCAINE HYDROCHLORIDE 50 MG: 10 SOLUTION INTRAVENOUS at 11:22

## 2025-03-28 RX ADMIN — SODIUM CHLORIDE, SODIUM LACTATE, POTASSIUM CHLORIDE, AND CALCIUM CHLORIDE 125 ML/HR: .6; .31; .03; .02 INJECTION, SOLUTION INTRAVENOUS at 09:47

## 2025-03-28 RX ADMIN — Medication 50 MCG: at 11:43

## 2025-03-28 RX ADMIN — PROPOFOL 50 MG: 10 INJECTION, EMULSION INTRAVENOUS at 11:27

## 2025-03-28 RX ADMIN — FENTANYL CITRATE 50 MCG: 50 INJECTION, SOLUTION INTRAMUSCULAR; INTRAVENOUS at 11:36

## 2025-03-28 RX ADMIN — Medication 100 MCG: at 11:45

## 2025-03-28 RX ADMIN — DEXMEDETOMIDINE HYDROCHLORIDE 20 MCG: 100 INJECTION, SOLUTION INTRAVENOUS at 11:26

## 2025-03-28 RX ADMIN — PROPOFOL 150 MCG/KG/MIN: 10 INJECTION, EMULSION INTRAVENOUS at 11:22

## 2025-03-28 RX ADMIN — DEXMEDETOMIDINE HYDROCHLORIDE 16 MCG: 100 INJECTION, SOLUTION INTRAVENOUS at 11:22

## 2025-03-28 RX ADMIN — PROPOFOL 50 MG: 10 INJECTION, EMULSION INTRAVENOUS at 12:06

## 2025-03-28 RX ADMIN — PROPOFOL 30 MG: 10 INJECTION, EMULSION INTRAVENOUS at 11:29

## 2025-03-28 RX ADMIN — PROPOFOL 50 MG: 10 INJECTION, EMULSION INTRAVENOUS at 11:24

## 2025-03-28 RX ADMIN — PROPOFOL 100 MG: 10 INJECTION, EMULSION INTRAVENOUS at 11:22

## 2025-03-28 NOTE — H&P
History and Physical - [facility]  Pratik Crooks 54 y.o. male MRN: [id number]                  HPI: Pratik Crooks is a 54 y.o. year old male who presents for colonoscopy      REVIEW OF SYSTEMS: Per the HPI, and otherwise unremarkable.    Historical Information   Past Medical History:   Diagnosis Date    Alcoholism (HCC)     Arthritis     Cellulitis of buccal space of mouth     Last assessed: 4/17/2017    Dog bite of multiple sites     Last assessed: 4/6/2016    Eczema     Last assessed: 12/7/2012    Epididymitis     Last assessed: 9/18/2016    Hyperlipidemia     Hypertension     Last assessed: 7/7/2015    Tinea corporis     Last assessed: 11/15/2013     Past Surgical History:   Procedure Laterality Date    APPENDECTOMY      ELBOW SURGERY      HAND SURGERY      KNEE SURGERY      SHOULDER SURGERY       Social History   Social History     Substance and Sexual Activity   Alcohol Use No    Comment: Stopped drinking alcohol     Social History     Substance and Sexual Activity   Drug Use Yes    Types: Marijuana    Comment: last use: last night     Social History     Tobacco Use   Smoking Status Every Day    Current packs/day: 1.00    Average packs/day: 1 pack/day for 25.7 years (25.7 ttl pk-yrs)    Types: Cigarettes    Start date: 7/10/1999   Smokeless Tobacco Never   Tobacco Comments    Last cigarette this AM 0530     Family History   Problem Relation Age of Onset    Other Mother         Solitary kidney    Prostate cancer Father     Substance Abuse Neg Hx        Meds/Allergies       Current Outpatient Medications:     atorvastatin (LIPITOR) 40 mg tablet    omeprazole (PriLOSEC) 20 mg delayed release capsule    sertraline (ZOLOFT) 50 mg tablet    butalbital-acetaminophen-caffeine (FIORICET,ESGIC) -40 mg per tablet    cyclobenzaprine (FLEXERIL) 10 mg tablet    polyethylene glycol (Golytely) 4000 mL solution    Current Facility-Administered Medications:     lactated ringers infusion, 125 mL/hr,  Intravenous, Continuous, Continue from Pre-op at 03/28/25 1047    Allergies   Allergen Reactions    Aspirin Vomiting    Erythromycin Rash    Penicillins Rash       Objective     /84   Pulse 92   Temp 98.2 °F (36.8 °C) (Temporal)   Resp 15   SpO2 98%       PHYSICAL EXAM    Gen: NAD  Head: NCAT  CV: RRR  CHEST: Clear  ABD: soft, NT/ND  EXT: no edema      ASSESSMENT/PLAN:  This is a 54 y.o. year old male here for colonoscopy, and he is stable and optimized for his procedure.

## 2025-03-28 NOTE — ANESTHESIA PREPROCEDURE EVALUATION
Procedure:  COLONOSCOPY    Relevant Problems   ANESTHESIA (within normal limits)      CARDIO   (+) Migraine   (+) Mixed hyperlipidemia   (-) Angina at rest (HCC)   (-) Angina of effort (HCC)   (-) REYNOLDS (dyspnea on exertion)      ENDO (within normal limits)      GI/HEPATIC (within normal limits)      /RENAL   (+) Benign prostatic hyperplasia without lower urinary tract symptoms      HEMATOLOGY (within normal limits)      NEURO/PSYCH   (+) Depression   (+) Migraine      PULMONARY   (+) Smoking   (-) URI (upper respiratory infection)      2020 TTE  SUMMARY     LEFT VENTRICLE:  Size was normal.  Systolic function was normal. Ejection fraction was estimated in the range of 60 % to 65 %.  There were no regional wall motion abnormalities.  Wall thickness was normal.  There was no evidence of concentric hypertrophy.     TRICUSPID VALVE:  There was trace regurgitation.     HISTORY: PRIOR HISTORY: ETOH, HTN  2020 myocardial perfusion  SUMMARY:  -  Stress results: Resting O2 Sat 98%. Peak O2 Sat 96%.  Duration of exercise was 12 min. Target heart rate was achieved. There was no chest pain during stress.  -  ECG conclusions: The stress ECG was negative for ischemia and normal.  -  Perfusion imaging: There were no perfusion defects.  -  Gated SPECT: The calculated left ventricular ejection fraction was 60 %. Left ventricular ejection fraction was within normal limits by visual estimate. There was no left ventricular regional abnormality.     IMPRESSIONS: Normal study after maximal exercise. Myocardial perfusion imaging was normal at rest and with stress. Left ventricular systolic function was normal.    Lab Results   Component Value Date    WBC 8.70 06/08/2024    HGB 15.2 06/08/2024    HCT 46.2 06/08/2024    MCV 92 06/08/2024     06/08/2024     Lab Results   Component Value Date     03/26/2016    SODIUM 137 06/08/2024    K 4.7 06/08/2024     06/08/2024    CO2 27 06/08/2024    AGAP 9 06/08/2024    BUN 10  06/08/2024    CREATININE 0.78 06/08/2024    GLUF 99 06/08/2024    CALCIUM 9.4 06/08/2024    AST 24 06/08/2024    ALT 20 06/08/2024    ALKPHOS 55 06/08/2024    PROT 6.4 03/26/2016    TP 7.1 06/08/2024    BILITOT 0.3 03/26/2016    TBILI 0.64 06/08/2024    EGFR 102 06/08/2024         Physical Exam    Airway    Mallampati score: III  TM Distance: >3 FB  Neck ROM: full     Dental        Cardiovascular  Rhythm: regular, Rate: normal    Pulmonary   Breath sounds normal    Other Findings        Anesthesia Plan  ASA Score- 2     Anesthesia Type- IV sedation with anesthesia with ASA Monitors.         Additional Monitors:     Airway Plan:            Plan Factors-Exercise tolerance (METS): >4 METS.    Chart reviewed. EKG reviewed.             Obstructive sleep apnea risk education given perioperatively.        Induction- intravenous.    Postoperative Plan-     Perioperative Resuscitation Plan - Level 1 - Full Code.       Informed Consent- Anesthetic plan and risks discussed with patient.  I personally reviewed this patient with the CRNA. Discussed and agreed on the Anesthesia Plan with the CRNA..      NPO Status:  Vitals Value Taken Time   Date of last liquid 03/28/25 03/28/25 0931   Time of last liquid 0530 03/28/25 0931   Date of last solid 03/26/25 03/28/25 0931   Time of last solid 1900 03/28/25 0931

## 2025-03-28 NOTE — ANESTHESIA POSTPROCEDURE EVALUATION
Post-Op Assessment Note    CV Status:  Stable  Pain Score: 0    Pain management: adequate       Mental Status:  Alert and awake   Hydration Status:  Euvolemic   PONV Controlled:  Controlled   Airway Patency:  Patent     Post Op Vitals Reviewed: Yes    No anethesia notable event occurred.            Last Filed PACU Vitals:  Vitals Value Taken Time   Temp 96.7 °F (35.9 °C) 03/28/25 1216   Pulse 66 03/28/25 1216   BP 90/55 03/28/25 1216   Resp 17 03/28/25 1216   SpO2 96 % 03/28/25 1216       Modified Liz:     Vitals Value Taken Time   Activity 2 03/28/25 1218   Respiration 2 03/28/25 1218   Circulation 2 03/28/25 1218   Consciousness 0 03/28/25 1218   Oxygen Saturation 2 03/28/25 1218     Modified Liz Score: 8

## 2025-04-03 ENCOUNTER — RESULTS FOLLOW-UP (OUTPATIENT)
Age: 55
End: 2025-04-03

## 2025-04-03 DIAGNOSIS — D12.6 TUBULAR ADENOMA OF COLON: Primary | ICD-10-CM

## 2025-04-03 PROCEDURE — 88305 TISSUE EXAM BY PATHOLOGIST: CPT | Performed by: PATHOLOGY

## 2025-04-04 ENCOUNTER — PREP FOR PROCEDURE (OUTPATIENT)
Dept: GASTROENTEROLOGY | Facility: CLINIC | Age: 55
End: 2025-04-04

## 2025-04-04 DIAGNOSIS — D12.6 TUBULAR ADENOMA OF COLON: Primary | ICD-10-CM

## 2025-04-04 DIAGNOSIS — Z86.0100 HISTORY OF COLON POLYPS: ICD-10-CM

## 2025-04-04 RX ORDER — SODIUM CHLORIDE, SODIUM LACTATE, POTASSIUM CHLORIDE, CALCIUM CHLORIDE 600; 310; 30; 20 MG/100ML; MG/100ML; MG/100ML; MG/100ML
125 INJECTION, SOLUTION INTRAVENOUS CONTINUOUS
OUTPATIENT
Start: 2025-04-04

## 2025-04-07 ENCOUNTER — TELEPHONE (OUTPATIENT)
Dept: GASTROENTEROLOGY | Facility: CLINIC | Age: 55
End: 2025-04-07

## 2025-04-07 NOTE — TELEPHONE ENCOUNTER
----- Message -----   From: Adilene Louise PA-C   Sent: 4/4/2025   4:05 PM EDT   To: [facility]     Please schedule colon EMR in 3-6 months. Will need 2 day prep, I can send GoLytely to pharmacy when scheduled.  Thanks!   ----- Message -----   From: Robert Garcia MD   Sent: 4/4/2025   2:39 PM EDT   To: Ai Short DO; [facility]     6 TAs and sampling of the abn mucosa was TA. Repeat colonoscopy with 2-day prep with [facility]. NormOxyshart message sent.     Hi advanced team,   Could you schedule his repeat colonoscopy with 2-day preparation with [facility] given large polyps and polyp at difficult location as described on his most recent colonoscopy report please? Sorry if this is a duplicate request (I'm covering Dr. Abad's inbox while she is away). Thanks!

## 2025-04-08 NOTE — TELEPHONE ENCOUNTER
Scheduled date of colonoscopy emr (as of today): 8/8/25  Physician performing colonoscopy: Dr. Ramirez   Location of colonoscopy: [facility]   Bowel prep reviewed with patient: golytely/dulcolax - 2 day   Instructions reviewed with patient by: Melody covington   Clearances:  n/a

## 2025-04-10 DIAGNOSIS — F32.A DEPRESSION, UNSPECIFIED DEPRESSION TYPE: ICD-10-CM

## 2025-04-29 ENCOUNTER — PATIENT MESSAGE (OUTPATIENT)
Dept: FAMILY MEDICINE CLINIC | Facility: CLINIC | Age: 55
End: 2025-04-29

## 2025-05-05 DIAGNOSIS — F32.A DEPRESSION, UNSPECIFIED DEPRESSION TYPE: ICD-10-CM

## 2025-05-05 DIAGNOSIS — K21.9 GASTROESOPHAGEAL REFLUX DISEASE: ICD-10-CM

## 2025-05-05 DIAGNOSIS — R25.2 LEG CRAMPS: ICD-10-CM

## 2025-05-05 DIAGNOSIS — I25.10 ASCVD (ARTERIOSCLEROTIC CARDIOVASCULAR DISEASE): ICD-10-CM

## 2025-05-05 DIAGNOSIS — E78.2 MIXED HYPERLIPIDEMIA: ICD-10-CM

## 2025-05-06 RX ORDER — ATORVASTATIN CALCIUM 40 MG/1
40 TABLET, FILM COATED ORAL DAILY
Qty: 90 TABLET | Refills: 1 | Status: SHIPPED | OUTPATIENT
Start: 2025-05-06

## 2025-05-06 RX ORDER — OMEPRAZOLE 20 MG/1
20 CAPSULE, DELAYED RELEASE ORAL DAILY
Qty: 90 CAPSULE | Refills: 1 | Status: SHIPPED | OUTPATIENT
Start: 2025-05-06

## 2025-05-16 ENCOUNTER — OFFICE VISIT (OUTPATIENT)
Dept: URGENT CARE | Facility: CLINIC | Age: 55
End: 2025-05-16
Payer: COMMERCIAL

## 2025-05-16 VITALS
BODY MASS INDEX: 27.06 KG/M2 | RESPIRATION RATE: 16 BRPM | HEART RATE: 88 BPM | DIASTOLIC BLOOD PRESSURE: 86 MMHG | WEIGHT: 178 LBS | SYSTOLIC BLOOD PRESSURE: 136 MMHG | TEMPERATURE: 97.6 F | OXYGEN SATURATION: 98 %

## 2025-05-16 DIAGNOSIS — L03.012 PARONYCHIA OF FINGER OF LEFT HAND: Primary | ICD-10-CM

## 2025-05-16 PROCEDURE — 26010 DRAINAGE OF FINGER ABSCESS: CPT | Performed by: PHYSICIAN ASSISTANT

## 2025-05-16 PROCEDURE — 10060 I&D ABSCESS SIMPLE/SINGLE: CPT | Performed by: PHYSICIAN ASSISTANT

## 2025-05-16 PROCEDURE — 99213 OFFICE O/P EST LOW 20 MIN: CPT | Performed by: PHYSICIAN ASSISTANT

## 2025-05-16 RX ORDER — SULFAMETHOXAZOLE AND TRIMETHOPRIM 800; 160 MG/1; MG/1
1 TABLET ORAL EVERY 12 HOURS SCHEDULED
Qty: 14 TABLET | Refills: 0 | Status: SHIPPED | OUTPATIENT
Start: 2025-05-16 | End: 2025-05-23

## 2025-05-16 NOTE — PATIENT INSTRUCTIONS
Take antibiotic as instructed.  Later this evening start warm Epsom salt soaks to left ring finger 5 to 10 minutes 2-3 times a day for the next 3 to 5 days.  Wash area with plain soap and water.  Stop using peroxide.  Keep clean and covered with Band-Aid.  No topical antibiotic ointment.    If severe worsening of pain and swelling proceed to emergency room for further evaluation otherwise follow-up with your primary care as needed.

## 2025-05-16 NOTE — PROGRESS NOTES
[facility]    NAME: Pratik Crooks is a 54 y.o. male  : 1970    MRN: [id number]  DATE: May 16, 2025  TIME: 3:30 PM    Assessment and Plan   Paronychia of finger of left hand [L03.012]  1. Paronychia of finger of left hand  sulfamethoxazole-trimethoprim (BACTRIM DS) 800-160 mg per tablet          Patient Instructions     Patient Instructions   Take antibiotic as instructed.  Later this evening start warm Epsom salt soaks to left ring finger 5 to 10 minutes 2-3 times a day for the next 3 to 5 days.  Wash area with plain soap and water.  Stop using peroxide.  Keep clean and covered with Band-Aid.  No topical antibiotic ointment.    If severe worsening of pain and swelling proceed to emergency room for further evaluation otherwise follow-up with your primary care as needed.  Chief Complaint     Chief Complaint   Patient presents with    Finger Pain     L ring finger pain, noticed redness x1 week.        History of Present Illness   Pratik Crooks presents to the clinic c/o  54-year-old right-hand-dominant male comes in with pain swelling left ring finger that started last Friday after biting a hangnail.  Has done that for years without any problems.  This time infection set in.  Has not been squeezing on area or doing any soaks.  Has been taking Aleve.  No fever or chills.  Hurts if he bumps it.        Review of Systems   Review of Systems   Constitutional: Negative.    Skin:  Positive for color change.       Current Medications   Long-Term Medications[1]    Current Allergies     Allergies as of 2025 - Reviewed 2025   Allergen Reaction Noted    Aspirin Vomiting     Erythromycin Rash 2016    Penicillins Rash           The following portions of the patient's history were reviewed and updated as appropriate: allergies, current medications, past family history, past medical history, past social history, past surgical history and problem list.  Past Medical History:   Diagnosis Date     Alcoholism (HCC)     Arthritis     Cellulitis of buccal space of mouth     Last assessed: 4/17/2017    Dog bite of multiple sites     Last assessed: 4/6/2016    Eczema     Last assessed: 12/7/2012    Epididymitis     Last assessed: 9/18/2016    Hyperlipidemia     Hypertension     Last assessed: 7/7/2015    Tinea corporis     Last assessed: 11/15/2013     Past Surgical History:   Procedure Laterality Date    APPENDECTOMY      ELBOW SURGERY      HAND SURGERY      KNEE SURGERY      SHOULDER SURGERY       Family History   Problem Relation Age of Onset    Other Mother         Solitary kidney    Prostate cancer Father     Substance Abuse Neg Hx        Objective   /86   Pulse 88   Temp 97.6 °F (36.4 °C) (Tympanic)   Resp 16   Wt 80.7 kg (178 lb)   SpO2 98%   BMI 27.06 kg/m²   No LMP for male patient.       Physical Exam     Physical Exam  Vitals and nursing note reviewed.   Constitutional:       General: He is not in acute distress.     Appearance: Normal appearance. He is well-developed. He is not ill-appearing, toxic-appearing or diaphoretic.     Cardiovascular:      Rate and Rhythm: Normal rate and regular rhythm.   Pulmonary:      Effort: Pulmonary effort is normal.     Skin:     General: Skin is warm and dry.      Findings: Erythema and lesion present.      Comments: Left ring finger with increased swelling redness fluctuant region proximal to fingernail ulnar aspect.  Tender to palpation.     Neurological:      Mental Status: He is alert and oriented to person, place, and time.       Incision and drain    Date/Time: 5/16/2025 2:40 PM    Performed by: Jaimee Morales PA-C  Authorized by: Jaimee Morales PA-C    Universal Protocol:  procedure performed by consultantConsent: Verbal consent obtained  Consent given by: patient  Patient understanding: patient states understanding of the procedure being performed  Patient identity confirmed: verbally with patient    Patient location:  Clinic  Location:     Type:   Abscess    Location:  Upper extremity    Upper extremity location:  L ring finger  Pre-procedure details:     Skin preparation:  Antiseptic wash  Anesthesia (see MAR for exact dosages):     Anesthesia method:  None  Procedure details:     Complexity:  Simple    Incision types:  Single straight    Scalpel blade:  11    Approach:  Puncture    Incision depth:  Subcutaneous    Drainage:  Purulent    Drainage amount:  Moderate    Wound treatment:  Wound left open  Post-procedure details:     Patient tolerance of procedure:  Tolerated well, no immediate complications  Comments:      I did cover patient's finger wound with Band-Aid.               [1]   Long-Term Medications   Medication Sig Dispense Refill    atorvastatin (LIPITOR) 40 mg tablet Take 1 tablet (40 mg total) by mouth daily 90 tablet 1    cyclobenzaprine (FLEXERIL) 10 mg tablet Take 1 tablet (10 mg total) by mouth daily at bedtime prn 15 tablet 1    omeprazole (PriLOSEC) 20 mg delayed release capsule Take 1 capsule (20 mg total) by mouth daily 90 capsule 1    polyethylene glycol (GOLYTELY) 4000 mL solution Take 4,000 mL by mouth once for 1 dose 4000 mL 0    sertraline (ZOLOFT) 50 mg tablet take 1 & 1/2 tablets by mouth daily 45 tablet 3

## 2025-07-14 ENCOUNTER — OFFICE VISIT (OUTPATIENT)
Dept: FAMILY MEDICINE CLINIC | Facility: CLINIC | Age: 55
End: 2025-07-14
Payer: COMMERCIAL

## 2025-07-14 VITALS
OXYGEN SATURATION: 98 % | TEMPERATURE: 98.1 F | HEIGHT: 69 IN | BODY MASS INDEX: 26.01 KG/M2 | WEIGHT: 175.6 LBS | HEART RATE: 82 BPM | DIASTOLIC BLOOD PRESSURE: 80 MMHG | SYSTOLIC BLOOD PRESSURE: 118 MMHG

## 2025-07-14 DIAGNOSIS — Z13.29 SCREENING FOR THYROID DISORDER: ICD-10-CM

## 2025-07-14 DIAGNOSIS — R22.1 SUBCUTANEOUS NODULE OF NECK: ICD-10-CM

## 2025-07-14 DIAGNOSIS — Z72.0 TOBACCO USE: ICD-10-CM

## 2025-07-14 DIAGNOSIS — R73.9 ELEVATED BLOOD SUGAR: ICD-10-CM

## 2025-07-14 DIAGNOSIS — Z12.5 SCREENING FOR PROSTATE CANCER: ICD-10-CM

## 2025-07-14 DIAGNOSIS — Z13.0 SCREENING FOR DEFICIENCY ANEMIA: ICD-10-CM

## 2025-07-14 DIAGNOSIS — K21.00 GASTROESOPHAGEAL REFLUX DISEASE WITH ESOPHAGITIS, UNSPECIFIED WHETHER HEMORRHAGE: ICD-10-CM

## 2025-07-14 DIAGNOSIS — E78.2 MIXED HYPERLIPIDEMIA: ICD-10-CM

## 2025-07-14 DIAGNOSIS — Z00.00 WELL ADULT EXAM: Primary | ICD-10-CM

## 2025-07-14 DIAGNOSIS — F32.A DEPRESSION, UNSPECIFIED DEPRESSION TYPE: ICD-10-CM

## 2025-07-14 PROCEDURE — 99396 PREV VISIT EST AGE 40-64: CPT | Performed by: FAMILY MEDICINE

## 2025-07-14 NOTE — ASSESSMENT & PLAN NOTE
1 cm freely movable nodule/node right posterior neck.  Patient denies any fevers chills night sweats unintentional weight loss.  Consider ultrasound or referral to plastic surgery.  Patient states he would like to hold off for now, but will call me if lesion changes in size.

## 2025-07-14 NOTE — ASSESSMENT & PLAN NOTE
Still smoking 1 pack daily.  Counseled again.  Again reminded Chay to schedule LDCT chest.  New order placed today  Orders:  •  CT lung screening program; Future

## 2025-07-14 NOTE — PROGRESS NOTES
Answers submitted by the patient for this visit:  Annual Physical (Submitted on 2025)  Diet/Nutrition choices: no special diet  Exercise choices: no formal exercise  Sleep choices: sleeps well  Hearing choices: decreased hearing bilateral ears  Vision choices: most recent eye exam < 1 year ago  Vision additional comments: Cataract in left eye  Dental choices: regular dental visits, brushes teeth once daily  Do you currently have an OB/GYN provider that you routinely follow with?: No  Any history of sexual transmitted disease/infection?: No  Urinary symptoms: none  Do you have an advance directive/living will?: No  Do you have a durable power of  (POA)?: No  Name: Pratik Crooks      : 1970      MRN: [id number]  Encounter Provider: Jim Torres DO  Encounter Date: 2025   Encounter department: [facility]    Assessment & Plan  Well adult exam  Patient presents for a 54-year-old yearly physical examination and med check.  Patient is doing well without complaints.  He is compliant with prescribed medications.  Still smoking 1 pack/day.  Does not drink alcohol.  Drinks 2 cups of coffee daily.  He states his diet is relatively healthy.  On exam, blood pressure 118/80.  BMI 26.31.  Remainder of exam is unremarkable.  Recommend continue healthy diet and regular exercise program (at least 150 minutes of aerobic exercise weekly).       Tobacco use  Still smoking 1 pack daily.  Counseled again.  Again reminded Chay to schedule LDCT chest.  New order placed today  Orders:  •  CT lung screening program; Future    Mixed hyperlipidemia  Continue atorvastatin 40 mg daily.  Continue follow-up with cardiology as directed  Orders:  •  Lipid Panel with Direct LDL reflex; Future    Elevated blood sugar  Last A1c 5.8%.  Continue reduced carb diet and exercise.  Will continue to monitor  Orders:  •  Comprehensive metabolic panel; Future  •  Hemoglobin A1C; Future    Depression, unspecified  depression type  Stable on sertraline 75 mg daily         Subcutaneous nodule of neck  1 cm freely movable nodule/node right posterior neck.  Patient denies any fevers chills night sweats unintentional weight loss.  Consider ultrasound or referral to plastic surgery.  Patient states he would like to hold off for now, but will call me if lesion changes in size.       Gastroesophageal reflux disease with esophagitis, unspecified whether hemorrhage  Doing well on omeprazole 20 mg daily       Screening for deficiency anemia    Orders:  •  CBC and differential; Future    Screening for thyroid disorder    Orders:  •  TSH, 3rd generation with Free T4 reflex; Future    Screening for prostate cancer    Orders:  •  PSA, Total Screen; Future           History of multiple polyps.  Patient last colonoscopy was January 2025.  He has another one scheduled for the fall.    Last tetanus booster 2016    Orders for yearly fasting blood work placed.  Will call with results    Yearly/as needed      History of Present Illness     Patient presents for a 54-year-old yearly physical examination and med check.  Patient is doing well without complaints.  He is compliant with prescribed medications.  Still smoking 1 pack/day.  Does not drink alcohol.  Drinks 2 cups of coffee daily.  He states his diet is relatively healthy.      Review of Systems   Constitutional:  Negative for chills and fever.   HENT:  Negative for ear pain and sore throat.    Eyes:  Negative for pain and visual disturbance.   Respiratory:  Negative for cough and shortness of breath.    Cardiovascular:  Negative for chest pain and palpitations.   Gastrointestinal:  Negative for abdominal pain and vomiting.   Genitourinary:  Negative for dysuria and hematuria.   Musculoskeletal:  Negative for arthralgias and back pain.   Skin:  Negative for color change and rash.   Neurological:  Negative for seizures and syncope.   All other systems reviewed and are negative.    Past Medical  "History[1]  Past Surgical History[2]  Family History[3]  Social History[4]  Medications[5]  Allergies   Allergen Reactions   • Aspirin Vomiting   • Erythromycin Rash   • Penicillins Rash     Immunization History   Administered Date(s) Administered   • COVID-19 PFIZER VACCINE 0.3 ML IM 03/27/2021, 04/17/2021, 12/04/2021   • Tdap 03/29/2016     Objective   /80 (BP Location: Left arm, Patient Position: Sitting, Cuff Size: Adult)   Pulse 82   Temp 98.1 °F (36.7 °C) (Temporal)   Ht 5' 8.5\" (1.74 m)   Wt 79.7 kg (175 lb 9.6 oz)   SpO2 98%   BMI 26.31 kg/m²     Physical Exam  Vitals and nursing note reviewed.   Constitutional:       General: He is not in acute distress.     Appearance: He is well-developed.   HENT:      Head: Normocephalic and atraumatic.     Eyes:      Conjunctiva/sclera: Conjunctivae normal.       Cardiovascular:      Rate and Rhythm: Normal rate and regular rhythm.      Heart sounds: No murmur heard.  Pulmonary:      Effort: Pulmonary effort is normal. No respiratory distress.      Breath sounds: Normal breath sounds.   Abdominal:      Palpations: Abdomen is soft.      Tenderness: There is no abdominal tenderness.     Musculoskeletal:         General: No swelling.      Cervical back: Neck supple.     Skin:     General: Skin is warm and dry.      Capillary Refill: Capillary refill takes less than 2 seconds.     Neurological:      Mental Status: He is alert.     Psychiatric:         Mood and Affect: Mood normal.                [1]  Past Medical History:  Diagnosis Date   • Alcoholism (HCC)    • Arthritis    • Cellulitis of buccal space of mouth     Last assessed: 4/17/2017   • Dog bite of multiple sites     Last assessed: 4/6/2016   • Eczema     Last assessed: 12/7/2012   • Epididymitis     Last assessed: 9/18/2016   • Hyperlipidemia    • Hypertension     Last assessed: 7/7/2015   • Tinea corporis     Last assessed: 11/15/2013   [2]  Past Surgical History:  Procedure Laterality Date   • " APPENDECTOMY     • ELBOW SURGERY     • HAND SURGERY     • KNEE SURGERY     • SHOULDER SURGERY     [3]  Family History  Problem Relation Name Age of Onset   • Other Mother          Solitary kidney   • Prostate cancer Father     • Substance Abuse Neg Hx     [4]  Social History  Tobacco Use   • Smoking status: Every Day     Current packs/day: 1.00     Average packs/day: 1 pack/day for 26.0 years (26.0 ttl pk-yrs)     Types: Cigarettes     Start date: 7/10/1999   • Smokeless tobacco: Never   • Tobacco comments:     Last cigarette this AM 0530   Vaping Use   • Vaping status: Never Used   Substance and Sexual Activity   • Alcohol use: No     Comment: Stopped drinking alcohol   • Drug use: Yes     Types: Marijuana     Comment: last use: last night   [5]  Current Outpatient Medications on File Prior to Visit   Medication Sig   • atorvastatin (LIPITOR) 40 mg tablet Take 1 tablet (40 mg total) by mouth daily   • butalbital-acetaminophen-caffeine (FIORICET,ESGIC) -40 mg per tablet Take 1 tablet by mouth every 6 (six) hours as needed for headaches   • cyclobenzaprine (FLEXERIL) 10 mg tablet Take 1 tablet (10 mg total) by mouth daily at bedtime prn   • omeprazole (PriLOSEC) 20 mg delayed release capsule Take 1 capsule (20 mg total) by mouth daily   • sertraline (ZOLOFT) 50 mg tablet take 1 & 1/2 tablets by mouth daily   • polyethylene glycol (GOLYTELY) 4000 mL solution Take 4,000 mL by mouth once for 1 dose

## 2025-07-14 NOTE — ASSESSMENT & PLAN NOTE
Continue atorvastatin 40 mg daily.  Continue follow-up with cardiology as directed  Orders:  •  Lipid Panel with Direct LDL reflex; Future

## 2025-07-14 NOTE — ASSESSMENT & PLAN NOTE
Last A1c 5.8%.  Continue reduced carb diet and exercise.  Will continue to monitor  Orders:  •  Comprehensive metabolic panel; Future  •  Hemoglobin A1C; Future

## 2025-07-19 ENCOUNTER — APPOINTMENT (OUTPATIENT)
Dept: LAB | Facility: MEDICAL CENTER | Age: 55
End: 2025-07-19
Payer: COMMERCIAL

## 2025-07-19 DIAGNOSIS — Z12.5 SCREENING FOR PROSTATE CANCER: ICD-10-CM

## 2025-07-19 DIAGNOSIS — R73.9 ELEVATED BLOOD SUGAR: ICD-10-CM

## 2025-07-19 DIAGNOSIS — Z13.0 SCREENING FOR DEFICIENCY ANEMIA: ICD-10-CM

## 2025-07-19 DIAGNOSIS — Z13.29 SCREENING FOR THYROID DISORDER: ICD-10-CM

## 2025-07-19 DIAGNOSIS — E78.2 MIXED HYPERLIPIDEMIA: ICD-10-CM

## 2025-07-19 LAB
ALBUMIN SERPL BCG-MCNC: 4.3 G/DL (ref 3.5–5)
ALP SERPL-CCNC: 54 U/L (ref 34–104)
ALT SERPL W P-5'-P-CCNC: 16 U/L (ref 7–52)
ANION GAP SERPL CALCULATED.3IONS-SCNC: 9 MMOL/L (ref 4–13)
AST SERPL W P-5'-P-CCNC: 21 U/L (ref 13–39)
BASOPHILS # BLD AUTO: 0.07 THOUSANDS/ÂΜL (ref 0–0.1)
BASOPHILS NFR BLD AUTO: 1 % (ref 0–1)
BILIRUB SERPL-MCNC: 0.69 MG/DL (ref 0.2–1)
BUN SERPL-MCNC: 16 MG/DL (ref 5–25)
CALCIUM SERPL-MCNC: 9.6 MG/DL (ref 8.4–10.2)
CHLORIDE SERPL-SCNC: 104 MMOL/L (ref 96–108)
CHOLEST SERPL-MCNC: 112 MG/DL (ref ?–200)
CO2 SERPL-SCNC: 27 MMOL/L (ref 21–32)
CREAT SERPL-MCNC: 0.81 MG/DL (ref 0.6–1.3)
EOSINOPHIL # BLD AUTO: 0.1 THOUSAND/ÂΜL (ref 0–0.61)
EOSINOPHIL NFR BLD AUTO: 1 % (ref 0–6)
ERYTHROCYTE [DISTWIDTH] IN BLOOD BY AUTOMATED COUNT: 13 % (ref 11.6–15.1)
EST. AVERAGE GLUCOSE BLD GHB EST-MCNC: 126 MG/DL
GFR SERPL CREATININE-BSD FRML MDRD: 100 ML/MIN/1.73SQ M
GLUCOSE P FAST SERPL-MCNC: 98 MG/DL (ref 65–99)
HBA1C MFR BLD: 6 %
HCT VFR BLD AUTO: 43.5 % (ref 36.5–49.3)
HDLC SERPL-MCNC: 36 MG/DL
HGB BLD-MCNC: 14.8 G/DL (ref 12–17)
IMM GRANULOCYTES # BLD AUTO: 0.04 THOUSAND/UL (ref 0–0.2)
IMM GRANULOCYTES NFR BLD AUTO: 0 % (ref 0–2)
LDLC SERPL CALC-MCNC: 65 MG/DL (ref 0–100)
LYMPHOCYTES # BLD AUTO: 2.17 THOUSANDS/ÂΜL (ref 0.6–4.47)
LYMPHOCYTES NFR BLD AUTO: 23 % (ref 14–44)
MCH RBC QN AUTO: 30.6 PG (ref 26.8–34.3)
MCHC RBC AUTO-ENTMCNC: 34 G/DL (ref 31.4–37.4)
MCV RBC AUTO: 90 FL (ref 82–98)
MONOCYTES # BLD AUTO: 0.81 THOUSAND/ÂΜL (ref 0.17–1.22)
MONOCYTES NFR BLD AUTO: 9 % (ref 4–12)
NEUTROPHILS # BLD AUTO: 6.38 THOUSANDS/ÂΜL (ref 1.85–7.62)
NEUTS SEG NFR BLD AUTO: 66 % (ref 43–75)
NRBC BLD AUTO-RTO: 0 /100 WBCS
PLATELET # BLD AUTO: 289 THOUSANDS/UL (ref 149–390)
PMV BLD AUTO: 9.3 FL (ref 8.9–12.7)
POTASSIUM SERPL-SCNC: 4.8 MMOL/L (ref 3.5–5.3)
PROT SERPL-MCNC: 7.2 G/DL (ref 6.4–8.4)
PSA SERPL-MCNC: 0.84 NG/ML (ref 0–4)
RBC # BLD AUTO: 4.83 MILLION/UL (ref 3.88–5.62)
SODIUM SERPL-SCNC: 140 MMOL/L (ref 135–147)
TRIGL SERPL-MCNC: 56 MG/DL (ref ?–150)
TSH SERPL DL<=0.05 MIU/L-ACNC: 1.51 UIU/ML (ref 0.45–4.5)
WBC # BLD AUTO: 9.57 THOUSAND/UL (ref 4.31–10.16)

## 2025-07-19 PROCEDURE — 36415 COLL VENOUS BLD VENIPUNCTURE: CPT

## 2025-07-19 PROCEDURE — 85025 COMPLETE CBC W/AUTO DIFF WBC: CPT

## 2025-07-19 PROCEDURE — 84443 ASSAY THYROID STIM HORMONE: CPT

## 2025-07-19 PROCEDURE — 83036 HEMOGLOBIN GLYCOSYLATED A1C: CPT

## 2025-07-19 PROCEDURE — 80053 COMPREHEN METABOLIC PANEL: CPT

## 2025-07-19 PROCEDURE — G0103 PSA SCREENING: HCPCS

## 2025-07-19 PROCEDURE — 80061 LIPID PANEL: CPT

## 2025-07-30 DIAGNOSIS — F32.A DEPRESSION, UNSPECIFIED DEPRESSION TYPE: ICD-10-CM

## 2025-08-07 RX ORDER — SODIUM CHLORIDE, SODIUM LACTATE, POTASSIUM CHLORIDE, CALCIUM CHLORIDE 600; 310; 30; 20 MG/100ML; MG/100ML; MG/100ML; MG/100ML
20 INJECTION, SOLUTION INTRAVENOUS CONTINUOUS
Status: CANCELLED | OUTPATIENT
Start: 2025-08-07

## 2025-08-07 RX ORDER — SODIUM CHLORIDE, SODIUM LACTATE, POTASSIUM CHLORIDE, CALCIUM CHLORIDE 600; 310; 30; 20 MG/100ML; MG/100ML; MG/100ML; MG/100ML
125 INJECTION, SOLUTION INTRAVENOUS CONTINUOUS
Status: CANCELLED | OUTPATIENT
Start: 2025-08-07

## 2025-08-08 ENCOUNTER — ANESTHESIA EVENT (OUTPATIENT)
Dept: GASTROENTEROLOGY | Facility: HOSPITAL | Age: 55
End: 2025-08-08
Payer: COMMERCIAL

## 2025-08-08 ENCOUNTER — ANESTHESIA (OUTPATIENT)
Dept: GASTROENTEROLOGY | Facility: HOSPITAL | Age: 55
End: 2025-08-08
Payer: COMMERCIAL

## 2025-08-08 ENCOUNTER — HOSPITAL ENCOUNTER (OUTPATIENT)
Dept: GASTROENTEROLOGY | Facility: HOSPITAL | Age: 55
Setting detail: OUTPATIENT SURGERY
Discharge: HOME | End: 2025-08-08
Payer: COMMERCIAL

## 2025-08-08 RX ORDER — MIDAZOLAM HYDROCHLORIDE 2 MG/2ML
INJECTION, SOLUTION INTRAMUSCULAR; INTRAVENOUS AS NEEDED
Status: DISCONTINUED | OUTPATIENT
Start: 2025-08-08 | End: 2025-08-08

## 2025-08-08 RX ORDER — PROPOFOL 10 MG/ML
INJECTION, EMULSION INTRAVENOUS AS NEEDED
Status: DISCONTINUED | OUTPATIENT
Start: 2025-08-08 | End: 2025-08-08

## 2025-08-08 RX ORDER — KETAMINE HYDROCHLORIDE 50 MG/ML
INJECTION, SOLUTION INTRAMUSCULAR; INTRAVENOUS AS NEEDED
Status: DISCONTINUED | OUTPATIENT
Start: 2025-08-08 | End: 2025-08-08

## 2025-08-08 RX ADMIN — PROPOFOL 100 MG: 10 INJECTION, EMULSION INTRAVENOUS at 13:43

## 2025-08-08 RX ADMIN — KETAMINE HYDROCHLORIDE 20 MG: 50 INJECTION INTRAMUSCULAR; INTRAVENOUS at 13:51

## 2025-08-08 RX ADMIN — PROPOFOL 100 MG: 10 INJECTION, EMULSION INTRAVENOUS at 14:08

## 2025-08-08 RX ADMIN — PROPOFOL 50 MG: 10 INJECTION, EMULSION INTRAVENOUS at 14:01

## 2025-08-08 RX ADMIN — PROPOFOL 100 MG: 10 INJECTION, EMULSION INTRAVENOUS at 13:47

## 2025-08-08 RX ADMIN — PROPOFOL 100 MG: 10 INJECTION, EMULSION INTRAVENOUS at 14:33

## 2025-08-08 RX ADMIN — KETAMINE HYDROCHLORIDE 30 MG: 50 INJECTION INTRAMUSCULAR; INTRAVENOUS at 13:43

## 2025-08-08 RX ADMIN — PROPOFOL 100 MG: 10 INJECTION, EMULSION INTRAVENOUS at 13:49

## 2025-08-08 RX ADMIN — PROPOFOL 100 MG: 10 INJECTION, EMULSION INTRAVENOUS at 14:20

## 2025-08-08 RX ADMIN — MIDAZOLAM HYDROCHLORIDE 2 MG: 1 INJECTION, SOLUTION INTRAMUSCULAR; INTRAVENOUS at 13:41

## 2025-08-08 RX ADMIN — PROPOFOL 50 MG: 10 INJECTION, EMULSION INTRAVENOUS at 13:52

## 2025-08-08 RX ADMIN — PROPOFOL 100 MG: 10 INJECTION, EMULSION INTRAVENOUS at 13:45

## 2025-08-08 RX ADMIN — PROPOFOL 50 MG: 10 INJECTION, EMULSION INTRAVENOUS at 13:56

## 2025-08-14 ENCOUNTER — RESULTS FOLLOW-UP (OUTPATIENT)
Dept: GASTROENTEROLOGY | Facility: CLINIC | Age: 55
End: 2025-08-14

## 2025-08-20 ENCOUNTER — OFFICE VISIT (OUTPATIENT)
Dept: GENETICS | Facility: CLINIC | Age: 55
End: 2025-08-20
Attending: STUDENT IN AN ORGANIZED HEALTH CARE EDUCATION/TRAINING PROGRAM

## 2025-08-20 DIAGNOSIS — Z86.0100 HISTORY OF COLON POLYPS: Primary | ICD-10-CM

## 2025-08-20 DIAGNOSIS — Z80.41 FAMILY HISTORY OF MALIGNANT NEOPLASM OF OVARY: ICD-10-CM

## 2025-08-20 DIAGNOSIS — Z80.42 FAMILY HISTORY OF MALIGNANT NEOPLASM OF PROSTATE: ICD-10-CM

## 2025-08-20 DIAGNOSIS — Z80.0 FAMILY HISTORY OF PANCREATIC CANCER: ICD-10-CM

## 2025-08-20 PROCEDURE — PBNCHG PB NO CHARGE PLACEHOLDER
